# Patient Record
Sex: FEMALE | Race: WHITE | NOT HISPANIC OR LATINO | Employment: FULL TIME | URBAN - NONMETROPOLITAN AREA
[De-identification: names, ages, dates, MRNs, and addresses within clinical notes are randomized per-mention and may not be internally consistent; named-entity substitution may affect disease eponyms.]

---

## 2023-03-01 PROBLEM — M79.671 PAIN OF RIGHT HEEL: Status: ACTIVE | Noted: 2023-03-01

## 2023-03-01 PROBLEM — B96.89 BACTERIAL VAGINOSIS: Status: ACTIVE | Noted: 2023-03-01

## 2023-03-01 PROBLEM — F17.200 SMOKER: Status: ACTIVE | Noted: 2023-03-01

## 2023-03-01 PROBLEM — H92.09 OTALGIA: Status: ACTIVE | Noted: 2023-03-01

## 2023-03-01 PROBLEM — N39.0 URINARY TRACT INFECTION: Status: ACTIVE | Noted: 2023-03-01

## 2023-03-01 PROBLEM — R68.84 JAW PAIN: Status: ACTIVE | Noted: 2023-03-01

## 2023-03-01 PROBLEM — N93.9 ABNORMAL UTERINE BLEEDING (AUB): Status: ACTIVE | Noted: 2023-03-01

## 2023-03-01 PROBLEM — U07.1 COVID-19: Status: ACTIVE | Noted: 2023-03-01

## 2023-03-01 PROBLEM — E78.00 HYPERCHOLESTEROLEMIA: Status: ACTIVE | Noted: 2023-03-01

## 2023-03-01 PROBLEM — B97.7 HPV IN FEMALE: Status: ACTIVE | Noted: 2023-03-01

## 2023-03-01 PROBLEM — R42 DIZZINESS: Status: ACTIVE | Noted: 2023-03-01

## 2023-03-01 PROBLEM — N94.6 DYSMENORRHEA: Status: ACTIVE | Noted: 2023-03-01

## 2023-03-01 PROBLEM — R07.9 CHEST PAIN: Status: ACTIVE | Noted: 2023-03-01

## 2023-03-01 PROBLEM — N76.0 BACTERIAL VAGINOSIS: Status: ACTIVE | Noted: 2023-03-01

## 2023-03-01 PROBLEM — I10 BENIGN ESSENTIAL HYPERTENSION: Status: ACTIVE | Noted: 2023-03-01

## 2023-03-01 PROBLEM — Z98.84 BARIATRIC SURGERY STATUS: Status: ACTIVE | Noted: 2023-03-01

## 2023-03-01 PROBLEM — E55.9 VITAMIN D DEFICIENCY: Status: ACTIVE | Noted: 2023-03-01

## 2023-03-01 PROBLEM — R29.818 SUSPECTED SLEEP APNEA: Status: ACTIVE | Noted: 2023-03-01

## 2023-03-01 PROBLEM — R87.622 LGSIL PAP SMEAR OF VAGINA: Status: ACTIVE | Noted: 2023-03-01

## 2023-03-01 PROBLEM — G47.30 SLEEP APNEA: Status: ACTIVE | Noted: 2023-03-01

## 2023-03-01 PROBLEM — N89.8 VAGINAL DISCHARGE: Status: ACTIVE | Noted: 2023-03-01

## 2023-03-01 PROBLEM — E66.01 MORBID OBESITY DUE TO EXCESS CALORIES (MULTI): Status: ACTIVE | Noted: 2023-03-01

## 2023-03-01 PROBLEM — Z04.9 CONDITION NOT FOUND: Status: ACTIVE | Noted: 2023-03-01

## 2023-03-01 RX ORDER — LOSARTAN POTASSIUM 25 MG/1
25 TABLET ORAL DAILY
COMMUNITY
End: 2023-09-08

## 2023-03-01 RX ORDER — VARENICLINE TARTRATE 0.5 (11)-1
KIT ORAL
COMMUNITY
Start: 2022-10-20 | End: 2023-10-04 | Stop reason: ALTCHOICE

## 2023-03-01 RX ORDER — ATORVASTATIN CALCIUM 20 MG/1
20 TABLET, FILM COATED ORAL DAILY
COMMUNITY
End: 2023-05-16

## 2023-03-01 RX ORDER — AMLODIPINE BESYLATE 10 MG/1
1 TABLET ORAL DAILY
COMMUNITY
Start: 2021-04-13 | End: 2023-06-14

## 2023-03-08 ENCOUNTER — APPOINTMENT (OUTPATIENT)
Dept: PRIMARY CARE | Facility: CLINIC | Age: 33
End: 2023-03-08
Payer: COMMERCIAL

## 2023-06-14 DIAGNOSIS — E11.9 TYPE 2 DIABETES MELLITUS WITHOUT COMPLICATION, WITHOUT LONG-TERM CURRENT USE OF INSULIN (MULTI): ICD-10-CM

## 2023-06-14 DIAGNOSIS — I10 ESSENTIAL (PRIMARY) HYPERTENSION: ICD-10-CM

## 2023-06-14 RX ORDER — EMPAGLIFLOZIN 25 MG/1
TABLET, FILM COATED ORAL
Qty: 90 TABLET | Refills: 0 | Status: SHIPPED | OUTPATIENT
Start: 2023-06-14 | End: 2023-10-04 | Stop reason: ALTCHOICE

## 2023-06-14 RX ORDER — AMLODIPINE BESYLATE 10 MG/1
TABLET ORAL
Qty: 90 TABLET | Refills: 0 | Status: SHIPPED | OUTPATIENT
Start: 2023-06-14 | End: 2023-10-24 | Stop reason: SDUPTHER

## 2023-06-16 ENCOUNTER — TELEPHONE (OUTPATIENT)
Dept: PRIMARY CARE | Facility: CLINIC | Age: 33
End: 2023-06-16
Payer: COMMERCIAL

## 2023-06-16 DIAGNOSIS — N39.0 URINARY TRACT INFECTION ASSOCIATED WITH CATHETERIZATION OF URINARY TRACT, UNSPECIFIED INDWELLING URINARY CATHETER TYPE, INITIAL ENCOUNTER (CMS-HCC): ICD-10-CM

## 2023-06-16 DIAGNOSIS — T83.511A URINARY TRACT INFECTION ASSOCIATED WITH CATHETERIZATION OF URINARY TRACT, UNSPECIFIED INDWELLING URINARY CATHETER TYPE, INITIAL ENCOUNTER (CMS-HCC): ICD-10-CM

## 2023-06-16 RX ORDER — NITROFURANTOIN 25; 75 MG/1; MG/1
100 CAPSULE ORAL 2 TIMES DAILY
Qty: 20 CAPSULE | Refills: 0 | Status: SHIPPED | OUTPATIENT
Start: 2023-06-16 | End: 2023-06-26

## 2023-06-16 NOTE — TELEPHONE ENCOUNTER
Pt called stated she did a home urine test +uti   Having urinary symptoms  Discussed with Dr. Maxwell magallanes

## 2023-07-04 DIAGNOSIS — F17.200 SMOKER: ICD-10-CM

## 2023-07-05 RX ORDER — VARENICLINE TARTRATE 1 MG/1
TABLET, FILM COATED ORAL
Qty: 56 TABLET | Refills: 1 | Status: SHIPPED | OUTPATIENT
Start: 2023-07-05 | End: 2023-07-20

## 2023-07-19 DIAGNOSIS — F17.200 SMOKER: ICD-10-CM

## 2023-07-20 RX ORDER — VARENICLINE TARTRATE 1 MG/1
TABLET, FILM COATED ORAL
Qty: 168 TABLET | Refills: 1 | Status: SHIPPED | OUTPATIENT
Start: 2023-07-20 | End: 2023-10-04 | Stop reason: ALTCHOICE

## 2023-10-04 ENCOUNTER — TELEMEDICINE (OUTPATIENT)
Dept: PRIMARY CARE | Facility: CLINIC | Age: 33
End: 2023-10-04
Payer: COMMERCIAL

## 2023-10-04 DIAGNOSIS — F32.A DEPRESSION, UNSPECIFIED DEPRESSION TYPE: ICD-10-CM

## 2023-10-04 PROCEDURE — 99213 OFFICE O/P EST LOW 20 MIN: CPT | Performed by: INTERNAL MEDICINE

## 2023-10-04 RX ORDER — CITALOPRAM 10 MG/1
10 TABLET ORAL DAILY
Qty: 30 TABLET | Refills: 1 | Status: SHIPPED | OUTPATIENT
Start: 2023-10-04 | End: 2023-10-25 | Stop reason: DRUGHIGH

## 2023-10-04 ASSESSMENT — PATIENT HEALTH QUESTIONNAIRE - PHQ9
SUM OF ALL RESPONSES TO PHQ QUESTIONS 1-9: 13
1. LITTLE INTEREST OR PLEASURE IN DOING THINGS: NEARLY EVERY DAY
10. IF YOU CHECKED OFF ANY PROBLEMS, HOW DIFFICULT HAVE THESE PROBLEMS MADE IT FOR YOU TO DO YOUR WORK, TAKE CARE OF THINGS AT HOME, OR GET ALONG WITH OTHER PEOPLE: SOMEWHAT DIFFICULT
4. FEELING TIRED OR HAVING LITTLE ENERGY: NEARLY EVERY DAY
5. POOR APPETITE OR OVEREATING: SEVERAL DAYS
2. FEELING DOWN, DEPRESSED OR HOPELESS: SEVERAL DAYS
9. THOUGHTS THAT YOU WOULD BE BETTER OFF DEAD, OR OF HURTING YOURSELF: NOT AT ALL
SUM OF ALL RESPONSES TO PHQ9 QUESTIONS 1 AND 2: 4
7. TROUBLE CONCENTRATING ON THINGS, SUCH AS READING THE NEWSPAPER OR WATCHING TELEVISION: NOT AT ALL
6. FEELING BAD ABOUT YOURSELF - OR THAT YOU ARE A FAILURE OR HAVE LET YOURSELF OR YOUR FAMILY DOWN: NOT AT ALL
8. MOVING OR SPEAKING SO SLOWLY THAT OTHER PEOPLE COULD HAVE NOTICED. OR THE OPPOSITE, BEING SO FIGETY OR RESTLESS THAT YOU HAVE BEEN MOVING AROUND A LOT MORE THAN USUAL: MORE THAN HALF THE DAYS
3. TROUBLE FALLING OR STAYING ASLEEP OR SLEEPING TOO MUCH: NEARLY EVERY DAY

## 2023-10-04 NOTE — PROGRESS NOTES
Patient ID: She states that she has been feeling down, not wanting to get out of bed, no motivation. She states that she was placed on medication in the past, which was effective, and then took herself off of it. She states she has been sleeping too much. She denies any SI, HI.     HPI Lala Smart is a 33 y.o. female with PMH remarkable for HTN, Type 2 DM  who presents to the office today for Depression.    Acute Issue: Depression  LOV: 2/14/23    REVIEW OF SYSTEMS:  Review of Systems  12 point review of systems negative unless stated above in HPI    VITAL SIGNS:  There were no vitals filed for this visit.    PHYSICAL EXAM:  CONSTITUTIONAL: Well developed, alert and oriented x3, no distress, alert and cooperative  EYES: EOMI intact  ENMT: Mucous membranes appear moist  HEART+LUNGS, ABD: unable to obtain d/t virtual visit today.   NEUROLOGIC: Alert & Oriented x3, able to follow simple commands    ALLERGIES:  Allergies   Allergen Reactions    Lisinopril Cough      MEDICATIONS:  Current Outpatient Medications on File Prior to Visit   Medication Sig Dispense Refill    amLODIPine (Norvasc) 10 mg tablet TAKE 1 TABLET BY MOUTH EVERY DAY 90 tablet 0    losartan (Cozaar) 25 mg tablet TAKE 1 TABLET BY MOUTH EVERY DAY 90 tablet 1    [DISCONTINUED] atorvastatin (Lipitor) 20 mg tablet TAKE 1 TABLET BY MOUTH EVERY DAY 90 tablet 0    [DISCONTINUED] Jardiance 25 mg TAKE 1 TABLET BY MOUTH EVERY DAY 90 tablet 0    [DISCONTINUED] varenicline (Chantix KIMBERLY) 0.5 mg (11)- 1 mg (42) tablet Take as directed per package      [DISCONTINUED] varenicline (Chantix) 1 mg tablet TAKE AS DIRECTED PER PACKAGE INSTRUCTIONS. 168 tablet 1     No current facility-administered medications on file prior to visit.        Your medication list            Accurate as of October 4, 2023  3:06 PM. If you have any questions, ask your nurse or doctor.                CONTINUE taking these medications        Instructions Last Dose Given Next Dose Due    amLODIPine 10 mg tablet  Commonly known as: Norvasc      TAKE 1 TABLET BY MOUTH EVERY DAY       losartan 25 mg tablet  Commonly known as: Cozaar      TAKE 1 TABLET BY MOUTH EVERY DAY              STOP taking these medications      atorvastatin 20 mg tablet  Commonly known as: Lipitor  Stopped by: Shalonda York MD        Jardiance 25 mg  Generic drug: empagliflozin  Stopped by: Shalonda York MD        varenicline 0.5 mg (11)- 1 mg (42) tablet  Commonly known as: Chantix KIMBERLY  Stopped by: Shalonda York MD        varenicline 1 mg tablet  Commonly known as: Chantix  Stopped by: Shalonda York MD                 RECENT LABS:  Lab Results   Component Value Date    WBC 9.1 02/14/2023    HGB 15.1 02/14/2023    HCT 46.2 (H) 02/14/2023     02/14/2023    CHOL 270 (H) 02/14/2023    TRIG 129 02/14/2023    HDL 72.0 02/14/2023    ALT 30 02/14/2023    AST 23 02/14/2023     (L) 02/14/2023    K 3.9 02/14/2023     02/14/2023    CREATININE 0.76 02/14/2023    BUN 13 02/14/2023    CO2 26 02/14/2023    TSH 1.08 05/26/2022    INR 1.0 04/15/2021    HGBA1C 5.0 04/15/2021       ASSESSMENT AND PLAN:  Assessment/Plan   An interactive audio and video telecommunication system which permits real time communications between the patient (at the originating site) and provider (at the distant site) was utilized to provide this telehealth service. Virtual visit is being performed for nursing home patient during the COVID-19 crisis due to restricted access, to reduce close contact with high-risk patients, and/or in an effort to preserve PPE. Verbal consent was obtained from patient and/or POA/guardian.    Depression  - she states she has been lacking motivation, has not wanted to get out of bed in mornings  - denies SI, HI  - she states she has felt this way in the past, was on Celexa and it was effective  - she is aware of side effects of medication, states she tolerated it well in the past  - will start on Celexa 10mg  daily, prescription sent in  - advised to follow up in one month    ------  Written by Anna Marie Cutler LPN, acting as a scribe for Dr. Arreola. This note accurately reflects the work and decisions made by Dr. Arreola.     I, Dr. Arreola, attest all medical record entries made by the scribe were under my direction and were personally dictated by me. I have reviewed the chart and agree that the record accurately reflects my performance of the history, physical exam, and assessment and plan.

## 2023-10-06 NOTE — PATIENT INSTRUCTIONS
It was great to see you in the office today! Here is what we discussed at your visit today:  WE have sent in prescription for Celexa as discussed  Follow up in one month

## 2023-10-24 DIAGNOSIS — F32.A DEPRESSION, UNSPECIFIED DEPRESSION TYPE: ICD-10-CM

## 2023-10-24 DIAGNOSIS — I10 ESSENTIAL (PRIMARY) HYPERTENSION: ICD-10-CM

## 2023-10-24 RX ORDER — AMLODIPINE BESYLATE 10 MG/1
10 TABLET ORAL DAILY
Qty: 90 TABLET | Refills: 0 | Status: SHIPPED | OUTPATIENT
Start: 2023-10-24 | End: 2023-12-21 | Stop reason: SDUPTHER

## 2023-10-25 RX ORDER — CITALOPRAM 20 MG/1
20 TABLET, FILM COATED ORAL DAILY
Qty: 90 TABLET | Refills: 0 | Status: SHIPPED | OUTPATIENT
Start: 2023-10-25 | End: 2024-01-18 | Stop reason: ALTCHOICE

## 2023-12-04 ENCOUNTER — APPOINTMENT (OUTPATIENT)
Dept: RADIOLOGY | Facility: HOSPITAL | Age: 33
End: 2023-12-04
Payer: COMMERCIAL

## 2023-12-04 ENCOUNTER — HOSPITAL ENCOUNTER (EMERGENCY)
Facility: HOSPITAL | Age: 33
Discharge: HOME | End: 2023-12-04
Attending: STUDENT IN AN ORGANIZED HEALTH CARE EDUCATION/TRAINING PROGRAM
Payer: COMMERCIAL

## 2023-12-04 VITALS
WEIGHT: 265 LBS | HEIGHT: 66 IN | HEART RATE: 89 BPM | TEMPERATURE: 97.9 F | BODY MASS INDEX: 42.59 KG/M2 | SYSTOLIC BLOOD PRESSURE: 150 MMHG | RESPIRATION RATE: 16 BRPM | DIASTOLIC BLOOD PRESSURE: 99 MMHG | OXYGEN SATURATION: 100 %

## 2023-12-04 DIAGNOSIS — M25.531 ACUTE PAIN OF RIGHT WRIST: Primary | ICD-10-CM

## 2023-12-04 PROCEDURE — 2500000001 HC RX 250 WO HCPCS SELF ADMINISTERED DRUGS (ALT 637 FOR MEDICARE OP): Performed by: STUDENT IN AN ORGANIZED HEALTH CARE EDUCATION/TRAINING PROGRAM

## 2023-12-04 PROCEDURE — 73110 X-RAY EXAM OF WRIST: CPT | Mod: RT,FY

## 2023-12-04 PROCEDURE — 99283 EMERGENCY DEPT VISIT LOW MDM: CPT | Performed by: STUDENT IN AN ORGANIZED HEALTH CARE EDUCATION/TRAINING PROGRAM

## 2023-12-04 RX ORDER — HYDROCODONE BITARTRATE AND ACETAMINOPHEN 5; 325 MG/1; MG/1
1 TABLET ORAL EVERY 6 HOURS PRN
Qty: 10 TABLET | Refills: 0 | Status: SHIPPED | OUTPATIENT
Start: 2023-12-04 | End: 2023-12-07

## 2023-12-04 RX ORDER — NAPROXEN 500 MG/1
500 TABLET ORAL
Qty: 30 TABLET | Refills: 0 | Status: SHIPPED | OUTPATIENT
Start: 2023-12-04 | End: 2023-12-19

## 2023-12-04 RX ORDER — HYDROCODONE BITARTRATE AND ACETAMINOPHEN 5; 325 MG/1; MG/1
1 TABLET ORAL ONCE
Status: COMPLETED | OUTPATIENT
Start: 2023-12-04 | End: 2023-12-04

## 2023-12-04 RX ADMIN — HYDROCODONE BITARTRATE AND ACETAMINOPHEN 1 TABLET: 5; 325 TABLET ORAL at 23:10

## 2023-12-04 ASSESSMENT — PAIN - FUNCTIONAL ASSESSMENT: PAIN_FUNCTIONAL_ASSESSMENT: 0-10

## 2023-12-04 ASSESSMENT — PAIN DESCRIPTION - ORIENTATION: ORIENTATION: RIGHT

## 2023-12-04 ASSESSMENT — PAIN DESCRIPTION - FREQUENCY: FREQUENCY: CONSTANT/CONTINUOUS

## 2023-12-04 ASSESSMENT — PAIN DESCRIPTION - PAIN TYPE: TYPE: ACUTE PAIN

## 2023-12-04 ASSESSMENT — PAIN DESCRIPTION - LOCATION: LOCATION: WRIST

## 2023-12-04 ASSESSMENT — PAIN SCALES - GENERAL: PAINLEVEL_OUTOF10: 10 - WORST POSSIBLE PAIN

## 2023-12-04 ASSESSMENT — COLUMBIA-SUICIDE SEVERITY RATING SCALE - C-SSRS
2. HAVE YOU ACTUALLY HAD ANY THOUGHTS OF KILLING YOURSELF?: NO
1. IN THE PAST MONTH, HAVE YOU WISHED YOU WERE DEAD OR WISHED YOU COULD GO TO SLEEP AND NOT WAKE UP?: NO
6. HAVE YOU EVER DONE ANYTHING, STARTED TO DO ANYTHING, OR PREPARED TO DO ANYTHING TO END YOUR LIFE?: NO

## 2023-12-04 ASSESSMENT — PAIN DESCRIPTION - PROGRESSION: CLINICAL_PROGRESSION: GRADUALLY WORSENING

## 2023-12-04 ASSESSMENT — PAIN DESCRIPTION - ONSET: ONSET: SUDDEN

## 2023-12-04 ASSESSMENT — PAIN DESCRIPTION - DESCRIPTORS: DESCRIPTORS: SHARP;ACHING;BURNING

## 2023-12-04 NOTE — Clinical Note
Lala Smart was seen and treated in our emergency department on 12/4/2023.  She may return to work on 12/06/2023.       If you have any questions or concerns, please don't hesitate to call.      Anna Marie Rodriguez MD
Lala Smart was seen and treated in our emergency department on 12/4/2023.  She may return to work on 12/06/2023.       If you have any questions or concerns, please don't hesitate to call.      Anna Marie Rodriguez MD
dentures/eyeglasses

## 2023-12-05 NOTE — DISCHARGE INSTRUCTIONS
You were seen in the emergency department today for wrist pain after a fall.  At this time we do not see a fracture.  However given your significant amount of pain, I have placed you in a thumb spica splint and recommend that you follow-up with a hand surgeon in the next 2 weeks for repeat x-rays.  I recommend taking anti-inflammatories as needed for pain and I have also prescribed you stronger pain medication as well.

## 2023-12-07 ENCOUNTER — APPOINTMENT (OUTPATIENT)
Dept: ORTHOPEDIC SURGERY | Facility: HOSPITAL | Age: 33
End: 2023-12-07
Payer: COMMERCIAL

## 2023-12-16 NOTE — ED PROVIDER NOTES
HPI   Chief Complaint   Patient presents with    Wrist Injury     Pt has right wrist pain from falling outside and catching herself.       33-year-old female presents for right wrist pain.  Patient states that she was ambulating when she slipped and fell, falling onto an outstretched hand.  She notes increased pain to her right wrist and thumb, worsens with movement.  Notes increased swelling.  No numbness or tingling.  No other injuries.  Did not hit her head, no loss consciousness.                          No data recorded                Patient History   Past Medical History:   Diagnosis Date    Acute bronchitis due to other specified organisms 03/30/2020    Acute viral bronchitis    Candidiasis, unspecified 06/13/2016    Yeast infection    Encounter for pregnancy test, result negative     Pregnancy examination or test, negative result    Encounter for pregnancy test, result unknown     Pregnancy examination or test, pregnancy unconfirmed    Essential (primary) hypertension 11/27/2018    White coat syndrome with hypertension    Hirsutism 06/07/2016    Hirsutism    Low grade squamous intraepithelial lesion on cytologic smear of vagina (LGSIL) 06/02/2016    LGSIL Pap smear of vagina    Papillomavirus as the cause of diseases classified elsewhere 06/02/2016    HPV in female    Personal history of other diseases of the female genital tract 05/02/2016    History of ovarian cyst    Personal history of other endocrine, nutritional and metabolic disease     History of hypercholesterolemia    Personal history of other endocrine, nutritional and metabolic disease 05/06/2015    History of obesity    Personal history of other infectious and parasitic diseases     History of chicken pox    Personal history of other specified conditions 02/25/2020    History of fatigue    Personal history of other specified conditions     History of abnormal Pap smear    Unspecified tubal pregnancy without intrauterine pregnancy     Ectopic  pregnancy, tubal     Past Surgical History:   Procedure Laterality Date     SECTION, CLASSIC  2016     Section    COLONOSCOPY  2016    Colonoscopy (Fiberoptic)    DILATION AND CURETTAGE OF UTERUS  2016    Dilation And Curettage    LAPAROSCOPY DIAGNOSTIC / BIOPSY / ASPIRATION / LYSIS  2016    Exploratory Laparoscopy    OTHER SURGICAL HISTORY  2016    Colposcopy Cervix With Biopsy(S)    OTHER SURGICAL HISTORY  2016    Laparoscopy With Excision Of Ectopic Pregnancy    TUBAL LIGATION  2016    Tubal Ligation     Family History   Problem Relation Name Age of Onset    Depression Mother          with anxiety    Diabetes type II Father       Social History     Tobacco Use    Smoking status: Not on file    Smokeless tobacco: Not on file   Substance Use Topics    Alcohol use: Not on file    Drug use: Not on file       Physical Exam   ED Triage Vitals [23 2142]   Temp Heart Rate Resp BP   36.5 °C (97.7 °F) (!) 111 20 (!) 151/101      SpO2 Temp Source Heart Rate Source Patient Position   100 % Oral Monitor Sitting      BP Location FiO2 (%)     Left arm --       Physical Exam  Vitals and nursing note reviewed.   Constitutional:       General: She is not in acute distress.     Appearance: She is not ill-appearing.   HENT:      Head: Normocephalic and atraumatic.      Mouth/Throat:      Mouth: Mucous membranes are moist.      Pharynx: Oropharynx is clear.   Eyes:      Extraocular Movements: Extraocular movements intact.      Conjunctiva/sclera: Conjunctivae normal.      Pupils: Pupils are equal, round, and reactive to light.   Cardiovascular:      Rate and Rhythm: Normal rate and regular rhythm.   Pulmonary:      Effort: Pulmonary effort is normal. No respiratory distress.      Breath sounds: Normal breath sounds.   Abdominal:      General: There is no distension.      Palpations: Abdomen is soft.      Tenderness: There is no abdominal tenderness.   Musculoskeletal:       Cervical back: Normal range of motion and neck supple.      Comments: Right hypothenar eminence with ecchymosis and swelling, Stafac tenderness, tenderness to palpation over the radial edge of wrist.  Minimal pain with supination pronation.  Sensation intact light touch.  Palpable radial and ulnar pulses.  Flexion extension intact to thumb.   Skin:     General: Skin is warm and dry.      Capillary Refill: Capillary refill takes less than 2 seconds.   Neurological:      General: No focal deficit present.      Mental Status: She is alert and oriented to person, place, and time. Mental status is at baseline.   Psychiatric:         Mood and Affect: Mood normal.         Behavior: Behavior normal.         ED Course & MDM   Diagnoses as of 12/16/23 2720   Acute pain of right wrist       Medical Decision Making  33 y.o. female presents to the ED with wrist pain and swelling after fall. No deformity, no open wounds, or neurovascular compromise on exam.  In the evaluation of this patient's arm pain I considered the following diagnoses: fracture, muscle strain, contusion, sprain.  X-ray negative for fracture.  Given patient's increased tenderness, place patient in thumb spica.  Patient given instructions for outpatient hand surgery evaluation with repeat x-rays.  Patient given oral pain medication.  Patient discharged home with outpatient follow-up recommended.        Procedure  Procedures     Anna Marie Rodriguez MD  12/16/23 1268

## 2023-12-21 DIAGNOSIS — I10 ESSENTIAL (PRIMARY) HYPERTENSION: ICD-10-CM

## 2023-12-22 RX ORDER — AMLODIPINE BESYLATE 10 MG/1
10 TABLET ORAL DAILY
Qty: 90 TABLET | Refills: 0 | Status: SHIPPED | OUTPATIENT
Start: 2023-12-22 | End: 2024-01-18 | Stop reason: SDUPTHER

## 2023-12-26 DIAGNOSIS — I10 BENIGN ESSENTIAL HYPERTENSION: ICD-10-CM

## 2023-12-26 RX ORDER — LOSARTAN POTASSIUM 25 MG/1
25 TABLET ORAL DAILY
Qty: 90 TABLET | Refills: 0 | Status: SHIPPED | OUTPATIENT
Start: 2023-12-26 | End: 2024-03-26

## 2024-01-18 ENCOUNTER — LAB (OUTPATIENT)
Dept: LAB | Facility: LAB | Age: 34
End: 2024-01-18
Payer: COMMERCIAL

## 2024-01-18 ENCOUNTER — OFFICE VISIT (OUTPATIENT)
Dept: PRIMARY CARE | Facility: CLINIC | Age: 34
End: 2024-01-18
Payer: COMMERCIAL

## 2024-01-18 VITALS
RESPIRATION RATE: 18 BRPM | SYSTOLIC BLOOD PRESSURE: 138 MMHG | OXYGEN SATURATION: 98 % | HEIGHT: 66 IN | HEART RATE: 95 BPM | DIASTOLIC BLOOD PRESSURE: 101 MMHG | BODY MASS INDEX: 42.77 KG/M2

## 2024-01-18 DIAGNOSIS — Z00.00 ANNUAL PHYSICAL EXAM: ICD-10-CM

## 2024-01-18 DIAGNOSIS — I10 ESSENTIAL (PRIMARY) HYPERTENSION: ICD-10-CM

## 2024-01-18 DIAGNOSIS — Z71.6 ENCOUNTER FOR SMOKING CESSATION COUNSELING: ICD-10-CM

## 2024-01-18 DIAGNOSIS — Z00.00 ANNUAL PHYSICAL EXAM: Primary | ICD-10-CM

## 2024-01-18 DIAGNOSIS — G47.00 INSOMNIA, UNSPECIFIED TYPE: ICD-10-CM

## 2024-01-18 DIAGNOSIS — F17.200 SMOKER: ICD-10-CM

## 2024-01-18 PROBLEM — J06.9 ACUTE URI: Status: RESOLVED | Noted: 2024-01-18 | Resolved: 2024-01-18

## 2024-01-18 PROBLEM — R55 NEAR SYNCOPE: Status: RESOLVED | Noted: 2024-01-18 | Resolved: 2024-01-18

## 2024-01-18 PROBLEM — R09.81 CONGESTION OF NASAL SINUS: Status: RESOLVED | Noted: 2024-01-18 | Resolved: 2024-01-18

## 2024-01-18 PROBLEM — J01.90 ACUTE SINUSITIS: Status: RESOLVED | Noted: 2024-01-18 | Resolved: 2024-01-18

## 2024-01-18 PROBLEM — R00.2 PALPITATIONS: Status: RESOLVED | Noted: 2024-01-18 | Resolved: 2024-01-18

## 2024-01-18 LAB
25(OH)D3 SERPL-MCNC: 10 NG/ML (ref 30–100)
ALBUMIN SERPL BCP-MCNC: 4.3 G/DL (ref 3.4–5)
ALP SERPL-CCNC: 74 U/L (ref 33–110)
ALT SERPL W P-5'-P-CCNC: 55 U/L (ref 7–45)
ANION GAP SERPL CALC-SCNC: 14 MMOL/L (ref 10–20)
AST SERPL W P-5'-P-CCNC: 35 U/L (ref 9–39)
BASOPHILS # BLD AUTO: 0.07 X10*3/UL (ref 0–0.1)
BASOPHILS NFR BLD AUTO: 0.8 %
BILIRUB SERPL-MCNC: 0.6 MG/DL (ref 0–1.2)
BUN SERPL-MCNC: 11 MG/DL (ref 6–23)
CALCIUM SERPL-MCNC: 9.4 MG/DL (ref 8.6–10.3)
CHLORIDE SERPL-SCNC: 103 MMOL/L (ref 98–107)
CHOLEST SERPL-MCNC: 251 MG/DL (ref 0–199)
CHOLESTEROL/HDL RATIO: 4.5
CO2 SERPL-SCNC: 26 MMOL/L (ref 21–32)
CREAT SERPL-MCNC: 0.75 MG/DL (ref 0.5–1.05)
EGFRCR SERPLBLD CKD-EPI 2021: >90 ML/MIN/1.73M*2
EOSINOPHIL # BLD AUTO: 0.16 X10*3/UL (ref 0–0.7)
EOSINOPHIL NFR BLD AUTO: 1.7 %
ERYTHROCYTE [DISTWIDTH] IN BLOOD BY AUTOMATED COUNT: 12.9 % (ref 11.5–14.5)
EST. AVERAGE GLUCOSE BLD GHB EST-MCNC: 91 MG/DL
GLUCOSE SERPL-MCNC: 88 MG/DL (ref 74–99)
HBA1C MFR BLD: 4.8 %
HCT VFR BLD AUTO: 45.7 % (ref 36–46)
HDLC SERPL-MCNC: 55.9 MG/DL
HGB BLD-MCNC: 14.8 G/DL (ref 12–16)
IMM GRANULOCYTES # BLD AUTO: 0.04 X10*3/UL (ref 0–0.7)
IMM GRANULOCYTES NFR BLD AUTO: 0.4 % (ref 0–0.9)
LDLC SERPL CALC-MCNC: 175 MG/DL
LYMPHOCYTES # BLD AUTO: 2.18 X10*3/UL (ref 1.2–4.8)
LYMPHOCYTES NFR BLD AUTO: 23.6 %
MCH RBC QN AUTO: 32.1 PG (ref 26–34)
MCHC RBC AUTO-ENTMCNC: 32.4 G/DL (ref 32–36)
MCV RBC AUTO: 99 FL (ref 80–100)
MONOCYTES # BLD AUTO: 0.7 X10*3/UL (ref 0.1–1)
MONOCYTES NFR BLD AUTO: 7.6 %
NEUTROPHILS # BLD AUTO: 6.09 X10*3/UL (ref 1.2–7.7)
NEUTROPHILS NFR BLD AUTO: 65.9 %
NON HDL CHOLESTEROL: 195 MG/DL (ref 0–149)
NRBC BLD-RTO: 0 /100 WBCS (ref 0–0)
PLATELET # BLD AUTO: 287 X10*3/UL (ref 150–450)
POTASSIUM SERPL-SCNC: 4 MMOL/L (ref 3.5–5.3)
PROT SERPL-MCNC: 7.2 G/DL (ref 6.4–8.2)
RBC # BLD AUTO: 4.61 X10*6/UL (ref 4–5.2)
SODIUM SERPL-SCNC: 139 MMOL/L (ref 136–145)
TRIGL SERPL-MCNC: 99 MG/DL (ref 0–149)
TSH SERPL-ACNC: 1.4 MIU/L (ref 0.44–3.98)
VIT B12 SERPL-MCNC: 346 PG/ML (ref 211–911)
VLDL: 20 MG/DL (ref 0–40)
WBC # BLD AUTO: 9.2 X10*3/UL (ref 4.4–11.3)

## 2024-01-18 PROCEDURE — 83036 HEMOGLOBIN GLYCOSYLATED A1C: CPT

## 2024-01-18 PROCEDURE — 99395 PREV VISIT EST AGE 18-39: CPT

## 2024-01-18 PROCEDURE — 3080F DIAST BP >= 90 MM HG: CPT

## 2024-01-18 PROCEDURE — 82607 VITAMIN B-12: CPT

## 2024-01-18 PROCEDURE — 4004F PT TOBACCO SCREEN RCVD TLK: CPT

## 2024-01-18 PROCEDURE — 36415 COLL VENOUS BLD VENIPUNCTURE: CPT

## 2024-01-18 PROCEDURE — 3075F SYST BP GE 130 - 139MM HG: CPT

## 2024-01-18 PROCEDURE — 82306 VITAMIN D 25 HYDROXY: CPT

## 2024-01-18 PROCEDURE — 99214 OFFICE O/P EST MOD 30 MIN: CPT

## 2024-01-18 RX ORDER — OMEPRAZOLE 20 MG/1
20 CAPSULE, DELAYED RELEASE ORAL
COMMUNITY

## 2024-01-18 RX ORDER — IBUPROFEN 200 MG
1 TABLET ORAL EVERY 24 HOURS
Qty: 45 PATCH | Refills: 0 | Status: SHIPPED | OUTPATIENT
Start: 2024-01-18 | End: 2024-03-03

## 2024-01-18 RX ORDER — VARENICLINE TARTRATE 1 MG/1
1 TABLET, FILM COATED ORAL 2 TIMES DAILY
Qty: 60 TABLET | Refills: 2 | Status: SHIPPED | OUTPATIENT
Start: 2024-01-18 | End: 2024-05-01

## 2024-01-18 RX ORDER — AMLODIPINE BESYLATE 10 MG/1
10 TABLET ORAL DAILY
Qty: 90 TABLET | Refills: 0 | Status: SHIPPED | OUTPATIENT
Start: 2024-01-18

## 2024-01-18 RX ORDER — TRAZODONE HYDROCHLORIDE 50 MG/1
50 TABLET ORAL NIGHTLY PRN
Qty: 30 TABLET | Refills: 0 | Status: SHIPPED | OUTPATIENT
Start: 2024-01-18 | End: 2024-02-22

## 2024-01-18 ASSESSMENT — PATIENT HEALTH QUESTIONNAIRE - PHQ9
1. LITTLE INTEREST OR PLEASURE IN DOING THINGS: NOT AT ALL
2. FEELING DOWN, DEPRESSED OR HOPELESS: NOT AT ALL
SUM OF ALL RESPONSES TO PHQ9 QUESTIONS 1 AND 2: 0

## 2024-01-18 ASSESSMENT — ENCOUNTER SYMPTOMS
CHILLS: 0
ACTIVITY CHANGE: 1
NAUSEA: 0
FATIGUE: 0
SHORTNESS OF BREATH: 0
VOMITING: 0
FEVER: 0
DIARRHEA: 0
APPETITE CHANGE: 0

## 2024-01-18 ASSESSMENT — PAIN SCALES - GENERAL: PAINLEVEL: 0-NO PAIN

## 2024-01-18 NOTE — PROGRESS NOTES
"Subjective   Patient ID:   Lala Smart is a 33 y.o. female who presents for Annual Exam (Requesting annual labs), Insomnia, Weight Loss (Would like to discuss options), and Nicotine Dependence (Would like to discuss options  to quit ).    HPI  Patient is a 33 y.o. female with PMH of HTN, Hypercholesterolemia, obesity, self-reported ADHD, current tobacco smoker, and suspected ARSALAN is here today for annual exam. Patient reports experiencing insomnia, not being able to fall or stay asleep. Reports getting 4-6 hours of sleep per night. Mentions she has tried OTC sleep aids like doxylamine succinate and melatonin in the past with no relief. Has tried sound machines. Mentions both her and her  snore. Patient had a sleep study done in 04/2021 which recommended \"in-lab polysomnography if there remains high clinical suspicion of ARSALAN\" with a \"consideration for referral to sleep medicine.\" Patient also reports she is ready to quite smoking. Mentions she has tried Chantix in the past and was very close to quitting, \"but the medication didn't last long enough.\" Smokes 1 ppd for the last 20 years. Patient also reports weighing the most she has in her life due to trauma she experienced in the last year. Mentions she is not as active as she used to be, but does try to focus on eating the right things and calorie counting.       Pain scale: 0 (no pain)  Living will? No  POA? No  Are you currently or have you recently been threatened or abused? No  Do you feel unsafe going back to the place you are living? No  Reported health: Fair  Dental visits? Yes  Hearing problems? No  Vision problems? Yes - wears contacts  Healthy diet? Yes  Exercise? No exercise  Adequate fluid intake? Yes    Social History     Tobacco Use    Smoking status: Every Day     Packs/day: 1.00     Years: 20.00     Additional pack years: 0.00     Total pack years: 20.00     Types: Cigarettes    Smokeless tobacco: Never   Substance Use Topics    Alcohol " use: Yes         There is no immunization history on file for this patient.    Review of Systems   Constitutional:  Positive for activity change. Negative for appetite change, chills, fatigue and fever.   Respiratory:  Negative for shortness of breath.    Cardiovascular:  Negative for chest pain.   Gastrointestinal:  Negative for diarrhea, nausea and vomiting.   All other systems reviewed and are negative.    12 point review of systems negative unless stated above in HPI    Vitals:    24 1039   BP: (!) 138/101   Pulse: 95   Resp: 18   SpO2: 98%       Physical Exam  Vitals reviewed.   Constitutional:       Appearance: Normal appearance.   HENT:      Head: Normocephalic and atraumatic.      Right Ear: Tympanic membrane, ear canal and external ear normal.      Left Ear: Tympanic membrane, ear canal and external ear normal.   Eyes:      Extraocular Movements: Extraocular movements intact.      Conjunctiva/sclera: Conjunctivae normal.      Pupils: Pupils are equal, round, and reactive to light.   Cardiovascular:      Rate and Rhythm: Normal rate and regular rhythm.      Pulses: Normal pulses.      Heart sounds: Normal heart sounds.   Pulmonary:      Effort: Pulmonary effort is normal.      Breath sounds: Normal breath sounds.   Abdominal:      General: Bowel sounds are normal.   Musculoskeletal:         General: Normal range of motion.      Cervical back: Normal range of motion and neck supple.      Right lower le+ Edema present.      Left lower le+ Edema present.   Neurological:      General: No focal deficit present.      Mental Status: She is alert and oriented to person, place, and time.   Psychiatric:         Mood and Affect: Mood normal.         Behavior: Behavior normal.         Thought Content: Thought content normal.         Judgment: Judgment normal.         Assessment/Plan   Problem List Items Addressed This Visit             ICD-10-CM    Smoker F17.200     - Patient is ready to quit today.  "Options for quitting discussed.  - Patient has tried Chantix in the past with almost full success.  - Recommend Chantix and Nicotine transdermal patch.  - Patient also reports having tried Wellbutrin in the past which she did not like. Stated it made her feel and act crazy.  - Follow up in 1 month to discuss dose adjustment.          Relevant Medications    varenicline (Chantix) 1 mg tablet    nicotine (Nicoderm CQ) 21 mg/24 hr patch    Annual physical exam - Primary Z00.00     - Annual blood work ordered.  - Continue with current medications and doses.  - Recommend continue taking BP at home. Patient to seek care at ER with chest pain, SOB, or any other worsening symptoms.   - Will follow up with results.          Relevant Orders    TSH with reflex to Free T4 if abnormal    Hemoglobin A1C    CBC and Auto Differential    Lipid Panel    Comprehensive Metabolic Panel    Vitamin D 25-Hydroxy,Total (for eval of Vitamin D levels)    Vitamin B12    Insomnia G47.00     - Patient having difficulty falling and staying asleep. Has PMH of suspected ARSALAN from sleep study in 04/2021.   - Has tried OTC medications and melatonin with no relief.  - Discussed lifestyle changes and wind down routine for bed time.  - Patient would like to try prescription sleep aid. Will try trazadone. Follow up in 1 month.  - Recommend following up with Adult Sleep Medicine per sleep study results of \"suspected ARSALAN\"          Relevant Medications    traZODone (Desyrel) 50 mg tablet    Other Relevant Orders    Referral to Adult Sleep Medicine     Other Visit Diagnoses         Codes    Essential (primary) hypertension     I10    Relevant Medications    amLODIPine (Norvasc) 10 mg tablet    Encounter for smoking cessation counseling     Z71.6    Relevant Medications    varenicline (Chantix) 1 mg tablet    nicotine (Nicoderm CQ) 21 mg/24 hr patch          "

## 2024-01-18 NOTE — ASSESSMENT & PLAN NOTE
- Annual blood work ordered.  - Continue with current medications and doses.  - Recommend continue taking BP at home. Patient to seek care at ER with chest pain, SOB, or any other worsening symptoms.   - Will follow up with results.

## 2024-01-18 NOTE — ASSESSMENT & PLAN NOTE
- Patient is ready to quit today. Options for quitting discussed.  - Patient has tried Chantix in the past with almost full success.  - Recommend Chantix and Nicotine transdermal patch.  - Patient also reports having tried Wellbutrin in the past which she did not like. Stated it made her feel and act crazy.  - Follow up in 1 month to discuss dose adjustment.

## 2024-01-18 NOTE — ASSESSMENT & PLAN NOTE
"- Patient having difficulty falling and staying asleep. Has PMH of suspected ARSALAN from sleep study in 04/2021.   - Has tried OTC medications and melatonin with no relief.  - Discussed lifestyle changes and wind down routine for bed time.  - Patient would like to try prescription sleep aid. Will try trazadone. Follow up in 1 month.  - Recommend following up with Adult Sleep Medicine per sleep study results of \"suspected ARSALAN\"   "

## 2024-01-22 DIAGNOSIS — R79.89 LOW VITAMIN D LEVEL: Primary | ICD-10-CM

## 2024-01-22 RX ORDER — ERGOCALCIFEROL 1.25 MG/1
50000 CAPSULE ORAL
Qty: 12 CAPSULE | Refills: 0 | Status: SHIPPED | OUTPATIENT
Start: 2024-01-22 | End: 2024-05-15

## 2024-02-06 NOTE — PROGRESS NOTES
" Patient: Lala Smart    [id number]  : 1990 -- AGE 33 y.o.    Provider: Aryan Arellano MD     [facility]   Service Date: 2024              [facility]  New Visit Note      The patient's referring provider is: Lata Cuenca PA-C    HPI:  Lala Smart is a 33 y.o. female with PMH notable for HTN, HLD, morbid obesity, vitamin D deficiency, COVID-19, insomnia, and nicotine dependence, who presents today for suspected sleep apnea and insomnia.      NIGHTTIME SYMPTOMS:   Snoring: {Blank single:::\"Yes\",\"No\"}  Witnessed apnea: {Blank single:::\"Yes\",\"No\"}  Nocturnal gasping: {Blank single:::\"Yes\",\"No\"}  Nocturnal choking: {Blank single:::\"Yes\",\"No\"}  Sleep walking: {Blank single:::\"Yes\",\"No\"}  Sleep talking:  {Blank single:::\"Yes\",\"No\"}  Dream enactment: {Blank single:::\"Yes\",\"No\"}  Bruxism: {Blank single:::\"Yes\",\"No\"}  Nocturnal excessive sweating: {Blank single:::\"Yes\",\"No\"}  Nocturnal GERD: {Blank single:::\"Yes\",\"No\"}  Morning headaches: {Blank single:::\"Yes\",\"No\"}  Morning dry mouth/sore throat: {Blank single:::\"Yes\",\"No\"}  Nocturia: {Blank single:::\"Yes\",\"No\"}  Restless sleep: {Blank single:::\"Yes\",\"No\"}  Falls from bed during sleep: {Blank single:::\"Yes\",\"No\"}  Sleep paralysis: {Blank single:77228::\"Yes\",\"No\"}  Hypnagogic/hypnopompic hallucinations: {Jeannine single:::\"Yes\",\"No\"}  Bedroom environment is conducive to sleep: {Jeannine single:::\"Yes\",\"No\"}    DAYTIME SYMPTOMS  [place]: ***  Daytime sleepiness: {Jeannine single:::\"Frequent\",\"Sometimes\",\"Occasionally\",\"No\"}  Fatigue: {Jeannine single:::\"Yes\",\"No\"}  Trouble with memory/concentration: {Jeannine single:::\"Yes\",\"No\"}  Dozing: {Jeannine single:::\"Occasionally\",\"No\"}  Feeling sleepy while driving: {Jeannine single:::\"Frequently\",\"Occasionally\",\"Denies\"}  Fallen asleep while driving: {Blank " "single:83795::\"Denies\"}  Close calls related to sleepiness and driving: {Blank single:19197::\"Denies\"}  Accidents related to sleepiness and driving: {Blank single:19197::\"Denies\"}    RLS symptoms: {Blank single:79397::\"Yes\",\"No\"} ***  Bed partner mentions pt kicks in sleep: {Blank single:19197::\"Yes\",\"No\"}    Cataplexy: {Blank single:25950::\"Yes\",\"No\"}    SLEEP HABITS:   Self-described morning/***night person  Preferred sleep position: {DESC; PRONE / SUPINE / LATERAL:26498}  Bedtime: ***, sleep latency ***  Wake time: ***  # of nocturnal awakenings: *** due to ***  Napping: ***. Napping is***not refreshing  Total estimated sleep per 24 hrs: *** hours    PRIOR SLEEP STUDIES:  HST 4/26/2021: weight 295 lbs, BMI 47.6. Sleep complaints of waking up gasping/choking, grunting, and sleep paralysis. Study was non-diagnostic for sleep apnea with an REI3% of 4.3/h, supine REI3% showed mild ARSALAN at 7.7/h (during the 15.5 minutes spent supine), nonsupine REI3% nondiagnostic at 4.2/h.  Respiratory events generally occurred in 2 clusters, suggesting a REM sleep predominance, though this cannot be confirmed due to lack of EEG monitoring on the study.  Mean SpO2 94.5%, luz 85%, and <=88% for 30.8 minutes.  Study report and hypnogram were reviewed personally by me.    PRIOR TREATMENTS:  doxylamine succinate and melatonin - no benefit  Sound machines    Patient Active Problem List   Diagnosis    Abnormal uterine bleeding (AUB)    Dysmenorrhea    Bacterial vaginosis    Bariatric surgery status    Benign essential hypertension    Chest pain    COVID-19    Dizziness    Morbid obesity due to excess calories (CMS/HCC)    Jaw pain    Otalgia    Pain of right heel    Sleep apnea    Smoker    Suspected sleep apnea    Urinary tract infection    Vaginal discharge    Vitamin D deficiency    Condition not found    HPV in female    Hypercholesterolemia    LGSIL Pap smear of vagina    Annual physical exam    Insomnia    Low vitamin D level "     Past Medical History:   Diagnosis Date    Acute bronchitis due to other specified organisms 2020    Acute viral bronchitis    Acute sinusitis 2024    Acute URI 2024    Candidiasis, unspecified 2016    Yeast infection    Congestion of nasal sinus 2024    Encounter for pregnancy test, result negative     Pregnancy examination or test, negative result    Encounter for pregnancy test, result unknown     Pregnancy examination or test, pregnancy unconfirmed    Essential (primary) hypertension 2018    White coat syndrome with hypertension    Hirsutism 2016    Hirsutism    Low grade squamous intraepithelial lesion on cytologic smear of vagina (LGSIL) 2016    LGSIL Pap smear of vagina    Near syncope 2024    Palpitations 2024    Papillomavirus as the cause of diseases classified elsewhere 2016    HPV in female    Personal history of other diseases of the female genital tract 2016    History of ovarian cyst    Personal history of other endocrine, nutritional and metabolic disease     History of hypercholesterolemia    Personal history of other endocrine, nutritional and metabolic disease 2015    History of obesity    Personal history of other infectious and parasitic diseases     History of chicken pox    Personal history of other specified conditions 2020    History of fatigue    Personal history of other specified conditions     History of abnormal Pap smear    Unspecified tubal pregnancy without intrauterine pregnancy     Ectopic pregnancy, tubal     Past Surgical History:   Procedure Laterality Date     SECTION, CLASSIC  2016     Section    COLONOSCOPY  2016    Colonoscopy (Fiberoptic)    DILATION AND CURETTAGE OF UTERUS  2016    Dilation And Curettage    LAPAROSCOPY DIAGNOSTIC / BIOPSY / ASPIRATION / LYSIS  2016    Exploratory Laparoscopy    OTHER SURGICAL HISTORY  2016    Colposcopy  Cervix With Biopsy(S)    OTHER SURGICAL HISTORY  05/02/2016    Laparoscopy With Excision Of Ectopic Pregnancy    TUBAL LIGATION  05/02/2016    Tubal Ligation     Current Outpatient Medications   Medication Sig Dispense Refill    amLODIPine (Norvasc) 10 mg tablet Take 1 tablet (10 mg) by mouth once daily. 90 tablet 0    doxylamine succinate (NITETIME SLEEP-AID ORAL) Take 2 tablets by mouth once daily at bedtime.      ergocalciferol (Vitamin D-2) 1.25 MG (66458 UT) capsule Take 1 capsule (50,000 Units) by mouth 1 (one) time per week. 12 capsule 0    losartan (Cozaar) 25 mg tablet Take 1 tablet (25 mg) by mouth once daily. 90 tablet 0    nicotine (Nicoderm CQ) 21 mg/24 hr patch Place 1 patch on the skin once every 24 hours. 45 patch 0    omeprazole (PriLOSEC) 20 mg DR capsule Take 1 capsule (20 mg) by mouth once daily in the morning. Take before meals. Do not crush or chew.      traZODone (Desyrel) 50 mg tablet Take 1 tablet (50 mg) by mouth as needed at bedtime for sleep. 30 tablet 0    varenicline (Chantix) 1 mg tablet Take 1 tablet (1 mg) by mouth 2 times a day. Take with full glass of water. 60 tablet 2     No current facility-administered medications for this visit.     Allergies   Allergen Reactions    Lisinopril Cough       FAMILY HISTORY OF SLEEP DISORDERS: ***  Family History   Problem Relation Name Age of Onset    Depression Mother          with anxiety    Diabetes type II Father         SOCIAL HISTORY  Employment: ***  Lives with: ***  Alcohol: ***  Cigarettes: ***  Illicits: ***  Caffeine: ***     ROS: 12 point ROS ***.    PHYSICAL EXAMINATION:   There were no vitals filed for this visit.  There is no height or weight on file to calculate BMI.  General: Awake. Alert. Comfortable. No apparent distress. ***  Speech: Normal  Comprehension: Normal  Mood: Stable  Affect: Appropriate  Eyes:   Eyelids: normal            ENT:          Nares {Actions; are/are not:40334} patent bilaterally. Septum deviation  "***absent. Salmon tongue position {Blank single:75476::\"I\",\"II\",\"III\",\"IV\"}. Tongue scalloping {is/is not:73864} present, tongue {is/is not:27362} enlarged, soft palate {is/is not:00059} elongated, hard palate {is/is not:25565} high arched. Uvula {is/is not:95160} enlarged. Retrognathia {is/is not:29045} present. Tonsils are {Blank single:19197::\"1+\",\"2+\",\"3+\",\"4+\",\"not enlarged\",\"surgically absent\"}. Dentition ***.           Neck:          Circumference: ***  Cardiac: Regular in rate and rhythm. No murmurs. *** unable to assess pulses or cardiac rate/rhythm. No edema in bilateral lower extremities.***  Pul:         Clear to auscultation bilaterally.*** Normal respiratory effort   Abd:         ***obese  Neuro: Alert, well-oriented. Cranial nerves II-XII grossly normal and symmetric.  Moves all limbs symmetrically with no evidence of significant focal weakness. No abnormal movements noted. Normal gait***      CPAP download:  DME: ***  Setup date: ***  Date range: ***  Days used: ***%  Days used >4 hours: ***%  Average usage (days used): ***  Setting: *** cm H2O  Pressure: 95th %ile *** cm H2O, median *** cm H2O, max *** cm H2O  Leak: *** L/min (95th %ile)  AHI: ***      LABS/DIAGNOSTICS:  Lab Results   Component Value Date    HGB 14.8 01/18/2024    CO2 26 01/18/2024    TSH 1.40 01/18/2024    FREET4 0.90 04/15/2021    HGBA1C 4.8 01/18/2024    FERRITIN 109 04/15/2021    IRON 203 (H) 04/15/2021    TIBC 359 04/15/2021    IRONSAT 57 (H) 04/15/2021    VITD25 10 (L) 01/18/2024    [id number] 346 01/18/2024        Echo: EF ***  MRI brain/CT head: ***  PFTs: ***      ASSESSMENT AND PLAN: Ms. Lala Smart is a 33 y.o. female with a history of {Blank multiple:19196::\"snoring\",\"witnessed apneas\",\"nocturnal gasping\",\"nocturnal choking\",\"restless sleep\",\"frequent night time awakenings\",\"fatigue\",\"excessive daytime sleepiness\"}.   She has a {Blank single:19197::\"crowded\"} oropharynx, a neck circumference of *** inches, and a " "BMI of *** kg/m2.    Her medical history is significant for {Jeannine multiple:14883::\"hypertension\",\"diabetes mellitus\"}.   She is at risk for ARSALAN. Untreated ARSALAN can lead to cardiovascular and metabolic complications. Further evaluation with {Jeannine single:09613::\"a sleep study is recommended. This can be performed at home or in the sleep laboratory. A negative home sleep study would necessitate an in-laboratory sleep study\",\"an in-laboratory sleep study is recommended\",\"a home sleep study is recommended. A negative home sleep study would necessitate an in-laboratory sleep study\"}.       #sleep disordered breathing  -We discussed the risk factors for sleep apnea, pathophysiology of sleep apnea, treatment options, and potential long-term complications of untreated ARSALAN, including cardiovascular and metabolic complications. We will start evaluation with a*** home sleep test.       All of the above was discussed with the {Jeannnie single:95487::\"patient and her partner\",\"patient and her family\",\"patient\"} in detail. {Jeannine single:60522::\"They voiced an understanding of the above. Patient prefers to get tested ***\",\"She voiced an understanding of the above and prefers to get tested ***\",\"They voiced an understanding of the above. Patient was agreeable to proceed further as advised\",\"She voiced an understanding of the above and was agreeable to proceed further as advised\"}. Procedure for the sleep study was discussed with her.    Around {Blank single:70889::\"60 minutes\",\"45 minutes\",\"30 minutes\"} were spent on this encounter, including time reviewing the chart, conducting the H&P, counseling the patient, and documenting/placing orders.    FOLLOW UP:  {Blank single:88316::\"After study to discuss results\"}  "

## 2024-02-07 ENCOUNTER — APPOINTMENT (OUTPATIENT)
Dept: SLEEP MEDICINE | Facility: CLINIC | Age: 34
End: 2024-02-07
Payer: COMMERCIAL

## 2024-02-15 ENCOUNTER — OFFICE VISIT (OUTPATIENT)
Dept: SLEEP MEDICINE | Facility: CLINIC | Age: 34
End: 2024-02-15
Payer: COMMERCIAL

## 2024-02-15 VITALS
BODY MASS INDEX: 52.13 KG/M2 | DIASTOLIC BLOOD PRESSURE: 90 MMHG | OXYGEN SATURATION: 98 % | HEART RATE: 98 BPM | WEIGHT: 293 LBS | SYSTOLIC BLOOD PRESSURE: 157 MMHG

## 2024-02-15 DIAGNOSIS — I10 HYPERTENSION, UNSPECIFIED TYPE: ICD-10-CM

## 2024-02-15 DIAGNOSIS — G47.30 SLEEP-DISORDERED BREATHING: ICD-10-CM

## 2024-02-15 DIAGNOSIS — F17.210 CIGARETTE NICOTINE DEPENDENCE WITHOUT COMPLICATION: ICD-10-CM

## 2024-02-15 DIAGNOSIS — G47.8 SLEEP PARALYSIS: ICD-10-CM

## 2024-02-15 DIAGNOSIS — E66.01 MORBID OBESITY DUE TO EXCESS CALORIES (MULTI): ICD-10-CM

## 2024-02-15 DIAGNOSIS — F51.04 CHRONIC INSOMNIA: Primary | ICD-10-CM

## 2024-02-15 PROCEDURE — 3077F SYST BP >= 140 MM HG: CPT | Performed by: PSYCHIATRY & NEUROLOGY

## 2024-02-15 PROCEDURE — 99205 OFFICE O/P NEW HI 60 MIN: CPT | Performed by: PSYCHIATRY & NEUROLOGY

## 2024-02-15 PROCEDURE — 3080F DIAST BP >= 90 MM HG: CPT | Performed by: PSYCHIATRY & NEUROLOGY

## 2024-02-15 PROCEDURE — 4004F PT TOBACCO SCREEN RCVD TLK: CPT | Performed by: PSYCHIATRY & NEUROLOGY

## 2024-02-15 ASSESSMENT — PATIENT HEALTH QUESTIONNAIRE - PHQ9
SUM OF ALL RESPONSES TO PHQ9 QUESTIONS 1 AND 2: 0
2. FEELING DOWN, DEPRESSED OR HOPELESS: NOT AT ALL
1. LITTLE INTEREST OR PLEASURE IN DOING THINGS: NOT AT ALL

## 2024-02-15 NOTE — PROGRESS NOTES
Patient: Lala Smart    [id number]  : 1990 -- AGE 33 y.o.    Provider: Aryan Arellano MD     [facility]   Service Date: 2024              [facility]  New Visit Note        The patient's referring provider is: Lata Cuenca PA-C    HPI:  Lala Smart is a 33 y.o. female HTN, HLD, morbid obesity, vitamin D deficiency, COVID-19, insomnia, and nicotine dependence, who presents today for suspected sleep apnea (had a nondiagnostic home sleep study in ) and insomnia, who presents today for evaluation.    Hard to fall asleep for her whole life. Runs in the family. Mind is running, she is over-thinking, brain does not shut off, has anxiety, cannot sleep despite feeling sleepy. Looks forward to going to bed, loves going to bed, loves sleeping, has great dreams. Starting taking OTC sleep aids at age 14 years old, without them she would be up until 1-2 AM. Worked well up until a few months ago. Has been taking 50 mg of doxylamine. Recently started on trazodone 50 mg at night, which has been a great help to fall asleep and she is repositioning much less during her sleep. Her sleep is also difficult due to her 's snoring. Sometimes wakes up and checks the clock, which stresses her out.    Rare sleep paralysis coinciding occasionally with hearing a buzzing noise in her head, can occur when she takes a nap (rarely naps). When her body is paralyzed she feels like/hears that her kids are walking around the house when they are really not home, and once felt like she is floating, and occasionally has had an incubus experience.    NIGHTTIME SYMPTOMS:   Snoring: states that her  tells her she snores. Started probably 4-5 years ago when she gained a lot of weight. Thinks it is quiet.   states she grunts in her sleep and she has had grunting in sleep that wakes her.  Witnessed apnea: no  Nocturnal gasping/choking: not obviously  happening from her perspective  Sleep walking: No  Sleep talking:  No  Dream enactment: No  Morning headaches: No  Morning dry mouth/sore throat: No  Nocturia: No  Restless sleep:  tells her she changes positions frequently, but she does not feel her sleep is restless  Bedroom environment is conducive to sleep: No - always has the TV on playing background noise (eg. Cartoons). Has a fan on.    DAYTIME SYMPTOMS  [place]: 1/24  Insomnia Severity Index: 15/28  Daytime sleepiness: None since taking trazodone, had sleep prior when taking doxylamine  Fatigue: None since taking trazodone, had sleep prior when taking doxylamine  Trouble with memory/concentration: No  Dozing: No  Feeling sleepy while driving: Denies    RLS symptoms: No   Cataplexy: No    SLEEP HABITS:   Self-described neither a morning person nor a night person. Likes to sleep in, but does not like to stay up late  Preferred sleep position: side or prone  Bedtime: 9:30 pm, takes her sleep aid, sleep latency within 30 minutes  Wake time: 7-8 am  Napping: rare.   Total estimated sleep per 24 hrs: 8-9 hours    PRIOR SLEEP STUDIES:  HST 4/26/2021: weight 295 lbs, BMI 47.6. Sleep complaints of waking up gasping/choking, grunting, and sleep paralysis. Study was non-diagnostic for sleep apnea with an REI3% of 4.3/h, supine REI3% showed mild ARSALAN at 7.7/h (during the 15.5 minutes spent supine), nonsupine REI3% nondiagnostic at 4.2/h.  Respiratory events generally occurred in 2 clusters, suggesting a REM sleep predominance, though this cannot be confirmed due to lack of EEG monitoring on the study.  Mean SpO2 94.5%, luz 85%, and <=88% for 30.8 minutes.  Study report and hypnogram were reviewed personally by me.     PRIOR TREATMENTS:  Melatonin - ineffective    Patient Active Problem List   Diagnosis    Abnormal uterine bleeding (AUB)    Dysmenorrhea    Bacterial vaginosis    Bariatric surgery status    Benign essential hypertension    Chest pain    COVID-19     Dizziness    Morbid obesity due to excess calories (CMS/[facility])    Jaw pain    Otalgia    Pain of right heel    Sleep apnea    Smoker    Suspected sleep apnea    Urinary tract infection    Vaginal discharge    Vitamin D deficiency    Condition not found    HPV in female    Hypercholesterolemia    LGSIL Pap smear of vagina    Annual physical exam    Insomnia    Low vitamin D level     Past Medical History:   Diagnosis Date    Acute bronchitis due to other specified organisms 03/30/2020    Acute viral bronchitis    Acute sinusitis 01/18/2024    Acute URI 01/18/2024    Candidiasis, unspecified 06/13/2016    Yeast infection    Congestion of nasal sinus 01/18/2024    Encounter for pregnancy test, result negative     Pregnancy examination or test, negative result    Encounter for pregnancy test, result unknown     Pregnancy examination or test, pregnancy unconfirmed    Essential (primary) hypertension 11/27/2018    White coat syndrome with hypertension    Hirsutism 06/07/2016    Hirsutism    Low grade squamous intraepithelial lesion on cytologic smear of vagina (LGSIL) 06/02/2016    LGSIL Pap smear of vagina    Near syncope 01/18/2024    Palpitations 01/18/2024    Papillomavirus as the cause of diseases classified elsewhere 06/02/2016    HPV in female    Personal history of other diseases of the female genital tract 05/02/2016    History of ovarian cyst    Personal history of other endocrine, nutritional and metabolic disease     History of hypercholesterolemia    Personal history of other endocrine, nutritional and metabolic disease 05/06/2015    History of obesity    Personal history of other infectious and parasitic diseases     History of chicken pox    Personal history of other specified conditions 02/25/2020    History of fatigue    Personal history of other specified conditions     History of abnormal Pap smear    Unspecified tubal pregnancy without intrauterine pregnancy     Ectopic pregnancy, tubal     Past  Surgical History:   Procedure Laterality Date     SECTION, CLASSIC  2016     Section    COLONOSCOPY  2016    Colonoscopy (Fiberoptic)    DILATION AND CURETTAGE OF UTERUS  2016    Dilation And Curettage    LAPAROSCOPY DIAGNOSTIC / BIOPSY / ASPIRATION / LYSIS  2016    Exploratory Laparoscopy    OTHER SURGICAL HISTORY  2016    Colposcopy Cervix With Biopsy(S)    OTHER SURGICAL HISTORY  2016    Laparoscopy With Excision Of Ectopic Pregnancy    TONSILLECTOMY Bilateral     circa 2018    TUBAL LIGATION  2016    Tubal Ligation     Current Outpatient Medications   Medication Sig Dispense Refill    amLODIPine (Norvasc) 10 mg tablet Take 1 tablet (10 mg) by mouth once daily. 90 tablet 0    doxylamine succinate (NITETIME SLEEP-AID ORAL) Take 2 tablets by mouth once daily at bedtime.      ergocalciferol (Vitamin D-2) 1.25 MG (36138 UT) capsule Take 1 capsule (50,000 Units) by mouth 1 (one) time per week. 12 capsule 0    losartan (Cozaar) 25 mg tablet Take 1 tablet (25 mg) by mouth once daily. 90 tablet 0    nicotine (Nicoderm CQ) 21 mg/24 hr patch Place 1 patch on the skin once every 24 hours. 45 patch 0    omeprazole (PriLOSEC) 20 mg DR capsule Take 1 capsule (20 mg) by mouth once daily in the morning. Take before meals. Do not crush or chew.      traZODone (Desyrel) 50 mg tablet Take 1 tablet (50 mg) by mouth as needed at bedtime for sleep. 30 tablet 0    varenicline (Chantix) 1 mg tablet Take 1 tablet (1 mg) by mouth 2 times a day. Take with full glass of water. 60 tablet 2     No current facility-administered medications for this visit.     Allergies   Allergen Reactions    Lisinopril Cough       FAMILY HISTORY OF SLEEP DISORDERS:   Family History   Problem Relation Name Age of Onset    Depression Mother          with anxiety    Diabetes type II Father         SOCIAL HISTORY  Employment: hemodialysis tech, works every other week  Lives with:  and 3 children  "(18, 13, and 10 yo)  Alcohol: occasional 1-2 drinks  Cigarettes: down from 1 ppd to 0.5 ppd with Chantix  Illicits: No  Caffeine: none     ROS: 12 point ROS positive for nasal congestion in the morning and due to dry air she has had some blood in her mucus when she blows her nose.  Also positive for SOB with exercise, weight gain, and anxiety.  All items/systems were reviewed and are negative..     PHYSICAL EXAMINATION:   Vitals:    02/15/24 1108   BP: 157/90   BP Location: Left arm   Patient Position: Sitting   BP Cuff Size: Large adult   Pulse: 98   SpO2: 98%   Weight: 147 kg (323 lb)     Body mass index is 52.13 kg/m².  General: Awake. Alert. Comfortable. No apparent distress.   Speech: Normal  Comprehension: Normal  Mood: Stable  Affect: Appropriate  Eyes:   Eyelids: normal            ENT:          Inferior nasal turbinates are mildly inflamed bilaterally. Septum deviation absent. Salmon tongue position class III-IV. Tongue scalloping is present, tongue is enlarged, soft palate is not elongated, hard palate is not high arched. Uvula is not enlarged. Retrognathia is not present. Tonsils are not enlarged. Dentition good.           Neck:          Circumference: 17\"  Cardiac: Regular in rate and rhythm. No murmurs. No edema in bilateral lower extremities.  Pul:         Clear to auscultation bilaterally. Normal respiratory effort   Abd:         obese  Neuro: Alert, well-oriented. Cranial nerves II-XII grossly normal and symmetric.  Moves all limbs symmetrically with no evidence of significant focal weakness. No abnormal movements noted. Normal gait      LABS/DIAGNOSTICS:  Lab Results   Component Value Date    HGB 14.8 01/18/2024    CO2 26 01/18/2024    TSH 1.40 01/18/2024    FREET4 0.90 04/15/2021    HGBA1C 4.8 01/18/2024    FERRITIN 109 04/15/2021    IRON 203 (H) 04/15/2021    TIBC 359 04/15/2021    IRONSAT 57 (H) 04/15/2021    VITD25 10 (L) 01/18/2024    [id number] 346 01/18/2024        Echo: never  MRI brain/CT " head: never  PFTs: never      ASSESSMENT AND PLAN: Ms. Lala Smart is a 33 y.o. female with a history of chronic insomnia with difficulty falling asleep (has TV on, spends excessive time awake in bed, has anxiety, female sex, and strong family history of sleep onset insomnia), with 2 decades of using doxylamine that has been ineffective in the last few months and now has been deriving benefit from trazodone 50 mg, and she has snoring and rare sleep paralysis. She had a home sleep study in 2021 that was non-diagnostic for sleep apnea, but home sleep testing can underestimate the severity of sleep disordered breathing compared to in-lab testing.    #chronic insomnia - likely multifactorial, as described above.   -improved with trazodone 50 mg, PCP prescribing.   -reviewed sleep hygiene, stimulus control and relaxation techniques in detail  -Advised pt to see if improving her sleep habits allows her to wean off of her sleep aid  -may be exacerbated by untreated sleep apnea - will do an in-lab sleep study to assess for sleep apnea    #sleep disordered breathing  -We discussed the risk factors for sleep apnea, pathophysiology of sleep apnea, treatment options, and potential long-term complications of untreated ARSALAN, including cardiovascular and metabolic complications. We will start evaluation with a split night in-lab sleep test.     #HTN  -management per PCP    #nicotine dependence  -quitting    #morbid obesity  -weight loss encouraged    #sleep paralysis  -educated pt that typical triggers are sleep deprivation, stress, psychological trauma, alcohol, sleep apnea, and sleeping supine, and educated her that it is a benign phenomenon due to the paralysis not lifting quickly enough when she is waking from REM sleep      All of the above was discussed with the patient in detail. She voiced an understanding of the above and was agreeable to proceed further as advised. Procedure for the sleep study was discussed with  her.    60 minutes were spent on this encounter, including time reviewing the chart, conducting the H&P, counseling the patient, and documenting/placing orders.    FOLLOW UP:  After study to discuss results

## 2024-02-19 ENCOUNTER — OFFICE VISIT (OUTPATIENT)
Dept: PRIMARY CARE | Facility: CLINIC | Age: 34
End: 2024-02-19
Payer: COMMERCIAL

## 2024-02-19 VITALS — DIASTOLIC BLOOD PRESSURE: 86 MMHG | SYSTOLIC BLOOD PRESSURE: 148 MMHG | HEART RATE: 93 BPM | OXYGEN SATURATION: 98 %

## 2024-02-19 DIAGNOSIS — G47.00 INSOMNIA, UNSPECIFIED TYPE: ICD-10-CM

## 2024-02-19 DIAGNOSIS — F17.200 SMOKER: ICD-10-CM

## 2024-02-19 DIAGNOSIS — Z71.6 ENCOUNTER FOR SMOKING CESSATION COUNSELING: Primary | ICD-10-CM

## 2024-02-19 PROBLEM — F32.A DEPRESSIVE DISORDER: Status: ACTIVE | Noted: 2024-02-19

## 2024-02-19 PROBLEM — R53.83 FATIGUE: Status: RESOLVED | Noted: 2024-02-19 | Resolved: 2024-02-19

## 2024-02-19 PROBLEM — M25.539 PAIN IN WRIST: Status: RESOLVED | Noted: 2024-02-19 | Resolved: 2024-02-19

## 2024-02-19 PROBLEM — O00.109 TUBAL ECTOPIC PREGNANCY (HHS-HCC): Status: RESOLVED | Noted: 2024-02-19 | Resolved: 2024-02-19

## 2024-02-19 PROBLEM — Z86.19 HISTORY OF VARICELLA: Status: RESOLVED | Noted: 2024-02-19 | Resolved: 2024-02-19

## 2024-02-19 PROBLEM — S69.90XA INJURY OF WRIST: Status: RESOLVED | Noted: 2024-02-19 | Resolved: 2024-02-19

## 2024-02-19 PROCEDURE — 3079F DIAST BP 80-89 MM HG: CPT

## 2024-02-19 PROCEDURE — 3008F BODY MASS INDEX DOCD: CPT

## 2024-02-19 PROCEDURE — 99213 OFFICE O/P EST LOW 20 MIN: CPT

## 2024-02-19 PROCEDURE — 3077F SYST BP >= 140 MM HG: CPT

## 2024-02-19 PROCEDURE — 4004F PT TOBACCO SCREEN RCVD TLK: CPT

## 2024-02-19 ASSESSMENT — ENCOUNTER SYMPTOMS
DIARRHEA: 0
FEVER: 0
VOMITING: 0
NAUSEA: 0
DIZZINESS: 0
SHORTNESS OF BREATH: 0
HEADACHES: 0
FATIGUE: 0
CHILLS: 0

## 2024-02-19 ASSESSMENT — PAIN SCALES - GENERAL: PAINLEVEL: 7

## 2024-02-19 NOTE — PROGRESS NOTES
Subjective   Patient ID: Lala Smart is a 33 y.o. female who presents for Follow-up (1 month ).    HPI   33 y.o. female with PMH of HTN, Hypercholesterolemia, obesity, self-reported ADHD, current tobacco smoker, and suspected ARSALAN is here today for follow up for smoking cessation and insomnia. Patient reports the Chantix is helping and is down to 1/2 ppd, sometimes 5-6 cigarettes per day. Has not used the nicotine patch yet. Patient also reports she saw sleep medicine and is scheduled for a sleep test. Patient reports trazadone has helped and has been getting the most restful sleep she has had yet. Patient would like to revisit weight loss surgery after sleep testing and after quitting smoking. Patient mentions skin tags on neck and armpit.     Review of Systems   Constitutional:  Negative for chills, fatigue and fever.   Respiratory:  Negative for shortness of breath.    Cardiovascular:  Negative for chest pain.   Gastrointestinal:  Negative for diarrhea, nausea and vomiting.   Skin:         Skin tags on neck and armpit   Neurological:  Negative for dizziness and headaches.   All other systems reviewed and are negative.      Objective   /86   Pulse 93   SpO2 98%     Physical Exam  Vitals reviewed.   Constitutional:       Appearance: Normal appearance. She is obese.   HENT:      Head: Normocephalic and atraumatic.   Cardiovascular:      Rate and Rhythm: Normal rate and regular rhythm.      Pulses: Normal pulses.      Heart sounds: Normal heart sounds.   Pulmonary:      Effort: Pulmonary effort is normal.      Breath sounds: Normal breath sounds.   Musculoskeletal:      Cervical back: Normal range of motion and neck supple.   Skin:     Comments: Skin tags noted on neck and right axilla   Neurological:      General: No focal deficit present.      Mental Status: She is alert and oriented to person, place, and time.   Psychiatric:         Mood and Affect: Mood normal.         Behavior: Behavior normal.          Thought Content: Thought content normal.         Judgment: Judgment normal.         Assessment/Plan   Problem List Items Addressed This Visit             ICD-10-CM    Smoker F17.200    Insomnia G47.00    Encounter for smoking cessation counseling - Primary Z71.6    BMI 50.0-59.9, adult (CMS/HCC) Z68.43     - Discussed follow up after 12 weeks of smoking cessation treatment. Discussed side effects of smoking while using the patch.  - Discussed follow up for sleep study and will revisit use of trazadone for sleep aid. Encouraged continual work on sleep and lifestyle habits.  - Will revisit weight loss surgery at follow up visit.   - Patient to follow up in 2 months.

## 2024-02-22 DIAGNOSIS — G47.00 INSOMNIA, UNSPECIFIED TYPE: ICD-10-CM

## 2024-02-22 DIAGNOSIS — R79.89 LOW VITAMIN D LEVEL: ICD-10-CM

## 2024-02-22 RX ORDER — TRAZODONE HYDROCHLORIDE 50 MG/1
50 TABLET ORAL NIGHTLY PRN
Qty: 30 TABLET | Refills: 0 | Status: SHIPPED | OUTPATIENT
Start: 2024-02-22 | End: 2024-03-19

## 2024-02-22 RX ORDER — ERGOCALCIFEROL 1.25 MG/1
50000 CAPSULE ORAL
Qty: 12 CAPSULE | Refills: 0 | OUTPATIENT
Start: 2024-02-22

## 2024-03-11 ENCOUNTER — TELEPHONE (OUTPATIENT)
Dept: PRIMARY CARE | Facility: CLINIC | Age: 34
End: 2024-03-11
Payer: COMMERCIAL

## 2024-03-11 DIAGNOSIS — R30.0 DYSURIA: Primary | ICD-10-CM

## 2024-03-11 RX ORDER — NITROFURANTOIN 25; 75 MG/1; MG/1
100 CAPSULE ORAL 2 TIMES DAILY
Qty: 14 CAPSULE | Refills: 0 | Status: SHIPPED | OUTPATIENT
Start: 2024-03-11 | End: 2024-03-18

## 2024-03-18 ENCOUNTER — LAB (OUTPATIENT)
Dept: LAB | Facility: LAB | Age: 34
End: 2024-03-18
Payer: COMMERCIAL

## 2024-04-03 ENCOUNTER — APPOINTMENT (OUTPATIENT)
Dept: SLEEP MEDICINE | Facility: CLINIC | Age: 34
End: 2024-04-03
Payer: COMMERCIAL

## 2024-04-10 ENCOUNTER — TELEPHONE (OUTPATIENT)
Dept: SLEEP MEDICINE | Facility: CLINIC | Age: 34
End: 2024-04-10
Payer: COMMERCIAL

## 2024-04-10 DIAGNOSIS — F51.04 CHRONIC INSOMNIA: ICD-10-CM

## 2024-04-10 DIAGNOSIS — I10 HYPERTENSION, UNSPECIFIED TYPE: ICD-10-CM

## 2024-04-10 DIAGNOSIS — G47.30 SLEEP-DISORDERED BREATHING: Primary | ICD-10-CM

## 2024-04-10 DIAGNOSIS — E66.01 MORBID OBESITY DUE TO EXCESS CALORIES (MULTI): ICD-10-CM

## 2024-04-10 NOTE — TELEPHONE ENCOUNTER
Pt called due to scheduling issues , work and sleep center, she is asking to do a home sleep test, would you be able to put in a order for a home sleep test instead of the in lab test

## 2024-04-26 DIAGNOSIS — I10 BENIGN ESSENTIAL HYPERTENSION: ICD-10-CM

## 2024-04-29 RX ORDER — LOSARTAN POTASSIUM 25 MG/1
25 TABLET ORAL DAILY
Qty: 90 TABLET | Refills: 0 | Status: SHIPPED | OUTPATIENT
Start: 2024-04-29 | End: 2024-05-01 | Stop reason: DRUGHIGH

## 2024-05-01 ENCOUNTER — OFFICE VISIT (OUTPATIENT)
Dept: PRIMARY CARE | Facility: CLINIC | Age: 34
End: 2024-05-01
Payer: COMMERCIAL

## 2024-05-01 VITALS
DIASTOLIC BLOOD PRESSURE: 96 MMHG | HEIGHT: 66 IN | OXYGEN SATURATION: 98 % | RESPIRATION RATE: 18 BRPM | HEART RATE: 88 BPM | BODY MASS INDEX: 52.13 KG/M2 | SYSTOLIC BLOOD PRESSURE: 148 MMHG

## 2024-05-01 DIAGNOSIS — R63.5 WEIGHT GAIN: ICD-10-CM

## 2024-05-01 DIAGNOSIS — L68.0 HIRSUTISM: ICD-10-CM

## 2024-05-01 DIAGNOSIS — I10 UNCONTROLLED HYPERTENSION: ICD-10-CM

## 2024-05-01 DIAGNOSIS — F17.200 SMOKER: ICD-10-CM

## 2024-05-01 DIAGNOSIS — N94.6 DYSMENORRHEA: ICD-10-CM

## 2024-05-01 DIAGNOSIS — Z71.6 ENCOUNTER FOR SMOKING CESSATION COUNSELING: ICD-10-CM

## 2024-05-01 PROCEDURE — 3080F DIAST BP >= 90 MM HG: CPT | Performed by: INTERNAL MEDICINE

## 2024-05-01 PROCEDURE — 4004F PT TOBACCO SCREEN RCVD TLK: CPT | Performed by: INTERNAL MEDICINE

## 2024-05-01 PROCEDURE — 3077F SYST BP >= 140 MM HG: CPT | Performed by: INTERNAL MEDICINE

## 2024-05-01 PROCEDURE — 3008F BODY MASS INDEX DOCD: CPT | Performed by: INTERNAL MEDICINE

## 2024-05-01 PROCEDURE — 99214 OFFICE O/P EST MOD 30 MIN: CPT | Performed by: INTERNAL MEDICINE

## 2024-05-01 RX ORDER — VARENICLINE TARTRATE 1 MG/1
1 TABLET, FILM COATED ORAL 2 TIMES DAILY
Qty: 60 TABLET | Refills: 2 | Status: SHIPPED | OUTPATIENT
Start: 2024-05-01 | End: 2024-07-30

## 2024-05-01 RX ORDER — LOSARTAN POTASSIUM 50 MG/1
50 TABLET ORAL DAILY
Qty: 90 TABLET | Refills: 1 | Status: SHIPPED | OUTPATIENT
Start: 2024-05-01 | End: 2024-05-21 | Stop reason: SDUPTHER

## 2024-05-01 RX ORDER — SEMAGLUTIDE 0.25 MG/.5ML
0.25 INJECTION, SOLUTION SUBCUTANEOUS
Qty: 2 ML | Refills: 0 | Status: SHIPPED | OUTPATIENT
Start: 2024-05-05 | End: 2024-05-27

## 2024-05-01 ASSESSMENT — ENCOUNTER SYMPTOMS
CONSTITUTIONAL NEGATIVE: 1
CARDIOVASCULAR NEGATIVE: 1
RESPIRATORY NEGATIVE: 1
MUSCULOSKELETAL NEGATIVE: 1
PSYCHIATRIC NEGATIVE: 1
NEUROLOGICAL NEGATIVE: 1

## 2024-05-01 ASSESSMENT — PAIN SCALES - GENERAL: PAINLEVEL: 0-NO PAIN

## 2024-05-01 ASSESSMENT — PATIENT HEALTH QUESTIONNAIRE - PHQ9
1. LITTLE INTEREST OR PLEASURE IN DOING THINGS: NOT AT ALL
SUM OF ALL RESPONSES TO PHQ9 QUESTIONS 1 AND 2: 0
2. FEELING DOWN, DEPRESSED OR HOPELESS: NOT AT ALL

## 2024-05-01 NOTE — PATIENT INSTRUCTIONS
It was great to see you in the office today! Here is what we discussed at your visit today:  As advised, I recommend that you see Dr. Habibeh Gitiforooz   [facility]  [address] [facility] [address]  GET DIRECTIONS  [phone]    As discussed, we will increase your Losartan to 50mg daily  Continue to Monitor your BP at home 2-3 times per week and call us with results after 3 weeks

## 2024-05-01 NOTE — PROGRESS NOTES
"Patient ID: She states she has been told by OB/GYN that she does not have PCOS. She states that she gets a lot of hair on her face and neck which she has had laser therapy for. She states that she has very painful menses. She states she had an ovary removed in the past which did not help at all with pain. She states that she gets \"fishing\" in her eyes regularly. She states she has never been this heavy in her life. The heaviest she was when she was pregnant was 260 pounds. She states that she was on Saxenda in the past and lost 50 pounds on it, she states she was on the highest dose. She is very interested in medication for weight loss. She states that she has a lot of sweating on her bilateral feet constantly. She states that she is also concerned about her BP being elevated. She admits that she had lost 50 pounds prior on weight loss medication, tolerated it well, and then went through stressful episode and gained the 50 pounds back and then some. She states she has been taking Trazodone for sleep which has been very effective and had recent sleep study which was negative for sleep apnea.     Chief Complaint   Patient presents with    Follow-up     Bp elevated  Weight loss injections or help with that  Feet sweat and hair growth on face       HPI Lala Smart is a 34 y.o. female with PMH remarkable for HTN, Hypercholesterolemia, obesity, self-reported ADHD, current tobacco smoker, and suspected ARSALAN who presents to the office today for check up.     HEALTH MAINTENANCE: FOLLOW UP   Last Office Visit: 2/19/24 with Lata Cuenca  Mammogram (40-75):   Pap smear (21-65, or hysterectomy q5yrs):  Last Labs: 1/18/24  Colonoscopy (45-75 or age 40 with 1st degree relative dx colon ca):  Lung cancer screening (50-78 y/o + 20 pack year + smoking/quit in last 15 years):   DEXA (65+, q 2 years):     Social History     Tobacco Use    Smoking status: Every Day     Current packs/day: 1.00     Average packs/day: 1 pack/day " "for 20.3 years (20.3 ttl pk-yrs)     Types: Cigarettes     Start date: 1/1/2004    Smokeless tobacco: Never    Tobacco comments:     Working on quitting - down to 1/2 pack or 5-6 per day   Substance Use Topics    Alcohol use: Yes    Drug use: Never     Review of Systems   Constitutional: Negative.    HENT: Negative.          + hirtuism   Respiratory: Negative.     Cardiovascular: Negative.    Genitourinary: Negative.    Musculoskeletal: Negative.    Neurological: Negative.    Psychiatric/Behavioral: Negative.       Visit Vitals  Vitals:    05/01/24 1048   BP: (!) 148/96   Pulse: 88   Resp: 18   SpO2: 98%   Height: 1.676 m (5' 6\")     Smoking Status Every Day     Physical Exam  Vitals reviewed.   Constitutional:       Appearance: Normal appearance.   HENT:      Head: Normocephalic and atraumatic.   Cardiovascular:      Rate and Rhythm: Normal rate and regular rhythm.      Pulses: Normal pulses.      Heart sounds: Normal heart sounds.   Pulmonary:      Effort: Pulmonary effort is normal.      Breath sounds: Normal breath sounds.   Abdominal:      General: Bowel sounds are normal.      Palpations: Abdomen is soft.   Musculoskeletal:         General: Normal range of motion.   Neurological:      General: No focal deficit present.      Mental Status: She is alert and oriented to person, place, and time.   Psychiatric:         Mood and Affect: Mood normal.         Behavior: Behavior normal.       Current Outpatient Medications   Medication Instructions    amLODIPine (NORVASC) 10 mg, oral, Daily    losartan (COZAAR) 25 mg, oral, Daily    nicotine (Nicoderm CQ) 21 mg/24 hr patch 1 patch, transdermal, Every 24 hours    omeprazole (PRILOSEC) 20 mg, oral, Daily before breakfast, Do not crush or chew.    traZODone (DESYREL) 50 mg, oral, Nightly PRN    varenicline (CHANTIX) 1 mg, oral, 2 times daily, Take with full glass of water.        Lab Results   Component Value Date    WBC 9.2 01/18/2024    HGB 14.8 01/18/2024    HCT 45.7 " 01/18/2024     01/18/2024    CHOL 251 (H) 01/18/2024    TRIG 99 01/18/2024    HDL 55.9 01/18/2024    ALT 55 (H) 01/18/2024    AST 35 01/18/2024     01/18/2024    K 4.0 01/18/2024     01/18/2024    CREATININE 0.75 01/18/2024    BUN 11 01/18/2024    CO2 26 01/18/2024    TSH 1.40 01/18/2024    INR 1.0 04/15/2021    HGBA1C 4.8 01/18/2024       ASSESSMENT AND PLAN:  Assessment/Plan   Diagnoses and all orders for this visit:  BMI 50.0-59.9, adult (Multi)  -     semaglutide, weight loss, (Wegovy) 0.25 mg/0.5 mL pen injector; Inject 0.25 mg under the skin 1 (one) time per week for 4 doses.    Uncontrolled hypertension  -     increase losartan (Cozaar) to 50 mg tablet; Take 1 tablet (50 mg) by mouth once daily.  -     continue with Amlodipine  -     advised to continue to Monitor your BP at home 2-3 times per week and call us with results after 3 weeks    Hirsutism, weight gain, dysmenorrhea, excessive sweating  - referred to OB in [place] for further workup to rule out PCOS    --------------------  Written by Anna Marie Cutler LPN, acting as a scribe for Dr. Arreola. This note accurately reflects the work and decisions made by Dr. Arreola.     I, Dr. Arreola, attest all medical record entries made by the scribe were under my direction and were personally dictated by me. I have reviewed the chart and agree that the record accurately reflects my performance of the history, physical exam, and assessment and plan.

## 2024-05-09 ENCOUNTER — TELEPHONE (OUTPATIENT)
Dept: PRIMARY CARE | Facility: CLINIC | Age: 34
End: 2024-05-09
Payer: COMMERCIAL

## 2024-05-15 DIAGNOSIS — R79.89 LOW VITAMIN D LEVEL: ICD-10-CM

## 2024-05-15 RX ORDER — ERGOCALCIFEROL 1.25 MG/1
50000 CAPSULE ORAL
Qty: 12 CAPSULE | Refills: 0 | Status: SHIPPED | OUTPATIENT
Start: 2024-05-19

## 2024-05-21 DIAGNOSIS — R60.9 EDEMA, UNSPECIFIED TYPE: Primary | ICD-10-CM

## 2024-05-21 DIAGNOSIS — I10 UNCONTROLLED HYPERTENSION: ICD-10-CM

## 2024-05-21 DIAGNOSIS — G47.00 INSOMNIA, UNSPECIFIED TYPE: ICD-10-CM

## 2024-05-21 RX ORDER — FUROSEMIDE 20 MG/1
20 TABLET ORAL DAILY
Qty: 30 TABLET | Refills: 0 | Status: SHIPPED | OUTPATIENT
Start: 2024-05-21 | End: 2025-05-21

## 2024-05-21 RX ORDER — TRAZODONE HYDROCHLORIDE 50 MG/1
50 TABLET ORAL NIGHTLY PRN
Qty: 90 TABLET | Refills: 0 | Status: SHIPPED | OUTPATIENT
Start: 2024-05-21

## 2024-05-21 RX ORDER — LOSARTAN POTASSIUM 50 MG/1
50 TABLET ORAL DAILY
Qty: 90 TABLET | Refills: 0 | Status: SHIPPED | OUTPATIENT
Start: 2024-05-21 | End: 2024-11-17

## 2024-06-13 DIAGNOSIS — R60.9 EDEMA, UNSPECIFIED TYPE: ICD-10-CM

## 2024-06-13 NOTE — PROGRESS NOTES
Subjective   Patient ID: Lala Smart is a 34 y.o. female who presents for No chief complaint on file..  HPI  BARIATRIC CONSULT  START WT: 319 IDEAL WT: 155 START EXCESS:  164  HT: 66  YRS OF OBESITY: since 2006  GREATEST WT LOSS IN LBS: 100  PROGRAMS FOR WT LOSS IN THE PAST : injections, calorie restriction, slim fast  PATIENT FRIEND WOLF MAY WILL BE HAVING SURGERY WITH DR. 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  Hepatitis A No.  Hepatitis B No.  Hepatitis C No.  Immunosuppressent drugs No.         HEENT:          Headache negative.         CARDIOLOGY:          History of Hyperlipidemia No.  Last stress test N/A.  Last echocardiogram N/A.  Chest pain No.  High blood pressure  yes .  Irregular heart beat No.  Known coronary artery disease No.  Pacemaker No.  Palpitations No.         RESPIRATORY:          Hx steroid use yes.  ER visits or Hospitalizations for breathing problems No.  Sleep Apnea insomnia.  CASILLAS (dyspnea on exertion) yes..  Hx of Asthma/COPD No.         GASTROENTEROLOGY:          Peptic ulcer No, Last EGD N/A, Last UGI N/A.  Colonoscopy Last Colonoscopy N/A.  Heartburn  yes.     ENDOCRINOLOGY:          Diabetes No.  Thyroid disorder No.         EXTREMITIES:          Varicose Veins No.  Stasis Ulcers No.  Ankle swelling  yes.  Personal history DVT No.  Personal history PE No.  Personal history of other thrombolic events No.  Family history of VTE No.  Known genetic bleeding or clotting disorder No.         FEMALE REPRODUCTIVE:          Uterine fibroids No.  Ovarian Cyst  yes. Infertility No.  Menstrual history Menopausal.         UROLOGY:          Kidney disease No.  Kidney stones No.  Previous UTIs No.  Urinary incontinence No.         MUSCULOSKELETAL:          Osteoporosis/Osteopenia No.  Arthritis No.  Joint pain yes.       SKIN:          Hidradenitis No.  Open skin wounds No.   Rosacea  yes.  Healing problems No.         PSYCHOLOGY:          Anxiety yes  Depression none.  Eating disorder denies.    Objective   Physical Exam    Assessment/Plan            Charlene Pringle LPN 06/13/24 4:32 PM

## 2024-06-14 RX ORDER — FUROSEMIDE 20 MG/1
20 TABLET ORAL DAILY
Qty: 90 TABLET | Refills: 0 | Status: SHIPPED | OUTPATIENT
Start: 2024-06-14

## 2024-06-21 ENCOUNTER — APPOINTMENT (OUTPATIENT)
Dept: SURGERY | Facility: CLINIC | Age: 34
End: 2024-06-21
Payer: COMMERCIAL

## 2024-06-21 VITALS
HEIGHT: 66 IN | HEART RATE: 105 BPM | DIASTOLIC BLOOD PRESSURE: 97 MMHG | WEIGHT: 293 LBS | BODY MASS INDEX: 47.09 KG/M2 | SYSTOLIC BLOOD PRESSURE: 171 MMHG

## 2024-06-21 DIAGNOSIS — I10 BENIGN ESSENTIAL HYPERTENSION: ICD-10-CM

## 2024-06-21 DIAGNOSIS — F17.200 SMOKER: ICD-10-CM

## 2024-06-21 DIAGNOSIS — E66.01 MORBID OBESITY WITH BMI OF 45.0-49.9, ADULT (MULTI): Primary | ICD-10-CM

## 2024-06-21 DIAGNOSIS — E78.00 HYPERCHOLESTEROLEMIA: ICD-10-CM

## 2024-06-21 DIAGNOSIS — R40.0 DAYTIME SOMNOLENCE: ICD-10-CM

## 2024-06-21 DIAGNOSIS — K21.9 GASTROESOPHAGEAL REFLUX DISEASE, UNSPECIFIED WHETHER ESOPHAGITIS PRESENT: ICD-10-CM

## 2024-06-21 PROCEDURE — 3080F DIAST BP >= 90 MM HG: CPT | Performed by: SURGERY

## 2024-06-21 PROCEDURE — 3008F BODY MASS INDEX DOCD: CPT | Performed by: SURGERY

## 2024-06-21 PROCEDURE — 99204 OFFICE O/P NEW MOD 45 MIN: CPT | Performed by: SURGERY

## 2024-06-21 PROCEDURE — 3077F SYST BP >= 140 MM HG: CPT | Performed by: SURGERY

## 2024-06-21 ASSESSMENT — PAIN SCALES - GENERAL: PAINLEVEL: 0-NO PAIN

## 2024-06-21 ASSESSMENT — LIFESTYLE VARIABLES
HOW OFTEN DO YOU HAVE SIX OR MORE DRINKS ON ONE OCCASION: NEVER
AUDIT-C TOTAL SCORE: 1
HOW MANY STANDARD DRINKS CONTAINING ALCOHOL DO YOU HAVE ON A TYPICAL DAY: 1 OR 2
HOW OFTEN DO YOU HAVE A DRINK CONTAINING ALCOHOL: MONTHLY OR LESS
SKIP TO QUESTIONS 9-10: 1

## 2024-06-26 PROBLEM — R40.0 DAYTIME SOMNOLENCE: Status: ACTIVE | Noted: 2024-06-26

## 2024-06-26 NOTE — H&P
History Of Present Illness  Lala Smart is a 34 y.o. woman here for consultation for bariatric surgery. She suffers from morbid obesity and has been overweight for many years and has a number of weight related comorbidities. She has attempted to lose weight several times over the years. She has had successes but has regained the weight at the completion of each of her dieting attempts. She is being referred for surgical intervention for her clinically significant morbid obesity.     -Bariatric Consultation:          START WT: 319 IDEAL WT: 155 START EXCESS:  164  HT: 66  YRS OF OBESITY: since 2006  GREATEST WT LOSS IN LBS: 100  PROGRAMS FOR WT LOSS IN THE PAST : injections, calorie restriction, slim fast     Past Medical History  She has a past medical history of Acute bronchitis due to other specified organisms (03/30/2020), Acute sinusitis (01/18/2024), Acute URI (01/18/2024), Anxiety, Candidiasis, unspecified (06/13/2016), Congestion of nasal sinus (01/18/2024), Encounter for pregnancy test, result negative, Encounter for pregnancy test, result unknown, Essential (primary) hypertension (11/27/2018), Fatigue (02/19/2024), GERD (gastroesophageal reflux disease) (2015), Hirsutism (06/07/2016), History of varicella (02/19/2024), HTN (hypertension), Injury of wrist (02/19/2024), Insomnia (2006), Insomnia, Low grade squamous intraepithelial lesion on cytologic smear of vagina (LGSIL) (06/02/2016), Near syncope (01/18/2024), Pain in wrist (02/19/2024), Palpitations (01/18/2024), Papillomavirus as the cause of diseases classified elsewhere (06/02/2016), Personal history of other diseases of the female genital tract (05/02/2016), Personal history of other endocrine, nutritional and metabolic disease, Personal history of other endocrine, nutritional and metabolic disease (05/06/2015), Personal history of other infectious and parasitic diseases, Personal history of other specified conditions (02/25/2020), Personal  history of other specified conditions, Snoring (), Tubal ectopic pregnancy (HHS-HCC) (2024), and Unspecified tubal pregnancy without intrauterine pregnancy (HHS-HCC).    Surgical History  She has a past surgical history that includes  section, classic (2016); Tubal ligation (2016); Other surgical history (2016); Laparoscopy diagnostic / biopsy / aspiration / lysis (2016); Other surgical history (2016); Dilation and curettage of uterus (2016); Colonoscopy (2016); and Tonsillectomy (Bilateral).     Social History  She reports that she has been smoking cigarettes. She started smoking about 20 years ago. She has a 20.5 pack-year smoking history. She has never used smokeless tobacco. She reports current alcohol use. She reports that she does not use drugs.    Family History  Family History   Problem Relation Name Age of Onset    Depression Mother Omi davis         with anxiety    COPD Mother Omi davis     Anxiety disorder Mother Omi davis     Diabetes type II Father      Obesity Father      Other (htn) Father      Other (morbid obesity) Sister      Other (morbid obesity) Brother          Allergies  Lisinopril    Review of Systems   Allergy/Immunologic:          HIV / AIDS No.  Hepatitis A No.  Hepatitis B No.  Hepatitis C No.  Immunosuppressent drugs No.         HEENT:          Headache negative.         CARDIOLOGY:          History of Hyperlipidemia No.  Last stress test N/A.  Last echocardiogram N/A.  Chest pain No.  High blood pressure  yes .  Irregular heart beat No.  Known coronary artery disease No.  Pacemaker No.  Palpitations No.         RESPIRATORY:          Hx steroid use yes.  ER visits or Hospitalizations for breathing problems No.  Sleep Apnea insomnia.  CASILLAS (dyspnea on exertion) yes..  Hx of Asthma/COPD No.         GASTROENTEROLOGY:          Peptic ulcer No, Last EGD N/A, Last UGI N/A.  Colonoscopy Last Colonoscopy N/A.  Heartburn  yes.      "ENDOCRINOLOGY:          Diabetes No.  Thyroid disorder No.         EXTREMITIES:          Varicose Veins No.  Stasis Ulcers No.  Ankle swelling  yes.  Personal history DVT No.  Personal history PE No.  Personal history of other thrombolic events No.  Family history of VTE No.  Known genetic bleeding or clotting disorder No.         FEMALE REPRODUCTIVE:          Uterine fibroids No.  Ovarian Cyst  yes. Infertility No.  Menstrual history Menopausal.         UROLOGY:          Kidney disease No.  Kidney stones No.  Previous UTIs No.  Urinary incontinence No.         MUSCULOSKELETAL:          Osteoporosis/Osteopenia No.  Arthritis No.  Joint pain yes.       SKIN:          Hidradenitis No.  Open skin wounds No.  Rosacea  yes.  Healing problems No.         PSYCHOLOGY:          Anxiety yes  Depression none.  Eating disorder denies.          Physical Exam       General Examination:         GENERAL APPEARANCE: alert and oriented x 3, Pleasant and cooperative, No Acute Distress.          HEENT: PERRLA.          NECK: no lymphadenopathy, no thyromegaly, no JVD, normal flexion, normal extension.          HEART: regular rate and rhythm.          LUNGS: clear to auscultation bilaterally.          CHEST: normal shape and expansion.          ABDOMEN: obese, no hernias present, soft and not tender, no guarding, no CVA tenderness.          EXTREMITIES: pulses 2 plus bilaterally, trace bilateral edema, no ulcerations.          SKIN: normal, no rash.          NEUROLOGIC EXAM: CN's II-XII grossly intact, gait normal.          BACK: no CVA tenderness.       Last Recorded Vitals  Blood pressure (!) 171/97, pulse 105, height 1.676 m (5' 6\"), weight 145 kg (319 lb).    Relevant Results           Assessment/Plan   Problem List Items Addressed This Visit             ICD-10-CM    Benign essential hypertension I10    Smoker F17.200    Hypercholesterolemia E78.00    Daytime somnolence R40.0     Other Visit Diagnoses         Codes    Morbid obesity " with BMI of 45.0-49.9, adult (Multi)    -  Primary E66.01, Z68.42    Relevant Orders    Referral to Psychology    Gastroesophageal reflux disease, unspecified whether esophagitis present     K21.9    Relevant Orders    Request for Pre-Admission Testing Visit            We spent 45 minutes reviewing the three surgical options available in the treatment of morbid obesity in our practice. We reviewed the laparoscopic approach to the Jermaine-en-Y gastric bypass, the vertical sleeve gastrectomy, and the adjustable gastric band. We reviewed the surgical technique, the risks, and my complication rates. We also reviewed the expected weight loss, the rules necessary for nacho-term success, the nutritional supplementation recommended for each operation, and the importance of incorporating excercise into the lifestyle to maintain the weight. We reviewed both the national history with each operation, my experience with each operation, the lack of long-term weight loss data with the vertical sleeve gastrectomy and the potential for exacerbating reflux with the vertical sleeve gastrectomy. In addition, we discussed the risk of adhesions, internal hernias, iron and calcium deficiency, dumping syndrome and potential for gastrojejunal ulcer with the RYGB.  Lala has daily reflux.  Based on this we discussed proceeding with RYGB as long as Lala quits smoking and understands that smoking is incompatible with weight loss surgery due to lifetime risk of developing a gastrojejunal ulcer.  She will need a sleep study prior to surgery as she is at risk for having undiagnosed ARSALAN based on symptoms and BMI of 51.  We will obtain EGD prior to surgery to assess her GERD symptoms.         En Levine MD

## 2024-07-11 ENCOUNTER — APPOINTMENT (OUTPATIENT)
Dept: SURGERY | Facility: CLINIC | Age: 34
End: 2024-07-11
Payer: COMMERCIAL

## 2024-07-11 ENCOUNTER — OFFICE VISIT (OUTPATIENT)
Dept: OBSTETRICS AND GYNECOLOGY | Facility: CLINIC | Age: 34
End: 2024-07-11
Payer: COMMERCIAL

## 2024-07-11 VITALS — HEIGHT: 66 IN | DIASTOLIC BLOOD PRESSURE: 76 MMHG | BODY MASS INDEX: 51.49 KG/M2 | SYSTOLIC BLOOD PRESSURE: 124 MMHG

## 2024-07-11 DIAGNOSIS — N89.8 VAGINAL ITCHING: Primary | ICD-10-CM

## 2024-07-11 PROCEDURE — 87205 SMEAR GRAM STAIN: CPT

## 2024-07-11 PROCEDURE — 3008F BODY MASS INDEX DOCD: CPT | Performed by: NURSE PRACTITIONER

## 2024-07-11 PROCEDURE — 99213 OFFICE O/P EST LOW 20 MIN: CPT | Performed by: NURSE PRACTITIONER

## 2024-07-11 PROCEDURE — 3074F SYST BP LT 130 MM HG: CPT | Performed by: NURSE PRACTITIONER

## 2024-07-11 PROCEDURE — 4004F PT TOBACCO SCREEN RCVD TLK: CPT | Performed by: NURSE PRACTITIONER

## 2024-07-11 PROCEDURE — 3078F DIAST BP <80 MM HG: CPT | Performed by: NURSE PRACTITIONER

## 2024-07-11 RX ORDER — FLUCONAZOLE 150 MG/1
150 TABLET ORAL ONCE
Qty: 1 TABLET | Refills: 1 | Status: SHIPPED | OUTPATIENT
Start: 2024-07-11 | End: 2024-07-11

## 2024-07-11 RX ORDER — NYSTATIN AND TRIAMCINOLONE ACETONIDE 100000; 1 [USP'U]/G; MG/G
OINTMENT TOPICAL 2 TIMES DAILY
Qty: 30 G | Refills: 0 | Status: SHIPPED | OUTPATIENT
Start: 2024-07-11

## 2024-07-11 ASSESSMENT — ENCOUNTER SYMPTOMS
FEVER: 0
DYSURIA: 0
DIFFICULTY URINATING: 0
CHILLS: 0
FREQUENCY: 0

## 2024-07-11 NOTE — PROGRESS NOTES
"Chief Complaint    Vaginal Itching        HPI    PATIENT IS HERE FOR VAGINAL ITCHING, STATES THAT SHE FELT UNCOMFORTABLE IN THE VAGINAL AREA ALONG WITH REDNESS AND PAIN.   PATIENT STATES THIS HAS BEEN GOING ON FOR A WEEK.    Last edited by Therese Ji MA on 2024  9:42 AM.         34 y.o.  female who presents with complaints of vaginal symptoms.   Symptoms began 1 week ago.   Reports symptoms of external vaginal itching, irritation, redness.  Denies symptoms of abnormal vaginal discharge, odor.  Denies fever, chills, urinary symptoms, pelvic pain.    She is sexually active with . Denies dyspareunia.   H/O tubal ligation.   PMH: HTN, obesity - BMI 51.49.     /76   Ht 1.676 m (5' 6\")   LMP 2024 (Exact Date)   BMI 51.49 kg/m²      Review of Systems   Constitutional:  Negative for chills and fever.   Genitourinary:  Positive for genital sores. Negative for decreased urine volume, difficulty urinating, dysuria, frequency, pelvic pain, urgency, vaginal bleeding, vaginal discharge and vaginal pain.        Physical Exam  Constitutional:       Appearance: Normal appearance.   HENT:      Head: Normocephalic.      Nose: Nose normal.   Pulmonary:      Effort: Pulmonary effort is normal.   Genitourinary:     Vagina: Normal.      Cervix: Normal.      Rectum: Normal.      Comments: Bilateral labial majora erythema as well as in skin folds of groin where skin is dry, flaking  Musculoskeletal:         General: Normal range of motion.      Cervical back: Normal range of motion and neck supple.   Skin:     General: Skin is warm and dry.   Neurological:      Mental Status: She is alert.   Psychiatric:         Mood and Affect: Mood normal.         Behavior: Behavior normal.          Assessment/Plan:  1. Vaginal itching  -Collected: Vaginitis Gram Stain For Bacterial Vaginosis + Yeast  -Will notify of results.  -Vulvar skin care reviewed - no scented soaps or detergents, avoid dryer sheets, wear " loose-fitting, cotton clothing, keep area dry.  -May apply barrier ointment such as Vaseline.   -Rx: fluconazole (Diflucan) 150 mg tablet; Take 1 tablet (150 mg) by mouth 1 time for 1 dose.  Dispense: 1 tablet; Refill:   -Rx: nystatin-triamcinolone (Mycolog II) ointment; Apply topically 2 times a day.  Dispense: 30 g; Refill: 0   -Follow up as needed.

## 2024-07-12 LAB
CLUE CELLS VAG LPF-#/AREA: ABNORMAL /[LPF]
NUGENT SCORE: 1
YEAST VAG WET PREP-#/AREA: PRESENT

## 2024-07-15 ENCOUNTER — TELEPHONE (OUTPATIENT)
Dept: PRIMARY CARE | Facility: CLINIC | Age: 34
End: 2024-07-15
Payer: COMMERCIAL

## 2024-07-15 DIAGNOSIS — U07.1 COVID-19: Primary | ICD-10-CM

## 2024-07-15 DIAGNOSIS — N89.8 VAGINAL ITCHING: ICD-10-CM

## 2024-07-15 DIAGNOSIS — B37.31 VAGINAL YEAST INFECTION: Primary | ICD-10-CM

## 2024-07-15 RX ORDER — DEXAMETHASONE 4 MG/1
4 TABLET ORAL
Qty: 5 TABLET | Refills: 0 | Status: SHIPPED | OUTPATIENT
Start: 2024-07-15 | End: 2024-07-20

## 2024-07-15 RX ORDER — FLUTICASONE PROPIONATE 50 MCG
1 SPRAY, SUSPENSION (ML) NASAL DAILY
Qty: 16 G | Refills: 0 | Status: SHIPPED | OUTPATIENT
Start: 2024-07-15 | End: 2024-07-17

## 2024-07-15 RX ORDER — GUAIFENESIN 600 MG/1
600 TABLET, EXTENDED RELEASE ORAL 2 TIMES DAILY
Qty: 10 TABLET | Refills: 0 | Status: SHIPPED | OUTPATIENT
Start: 2024-07-15 | End: 2024-07-20

## 2024-07-15 RX ORDER — FLUCONAZOLE 150 MG/1
150 TABLET ORAL
Qty: 2 TABLET | Refills: 0 | Status: SHIPPED | OUTPATIENT
Start: 2024-07-15

## 2024-07-15 RX ORDER — AZITHROMYCIN 500 MG/1
500 TABLET, FILM COATED ORAL DAILY
Qty: 5 TABLET | Refills: 0 | Status: SHIPPED | OUTPATIENT
Start: 2024-07-15 | End: 2024-07-17 | Stop reason: SDUPTHER

## 2024-07-15 NOTE — TELEPHONE ENCOUNTER
C/o bilateral ear pain, runny nose, chills/sweats, loss of taste, weak, fatigue  Covid test + today     Allergy: lisinopril     Req medication?

## 2024-07-16 DIAGNOSIS — I10 ESSENTIAL (PRIMARY) HYPERTENSION: ICD-10-CM

## 2024-07-16 RX ORDER — AMLODIPINE BESYLATE 10 MG/1
10 TABLET ORAL DAILY
Qty: 90 TABLET | Refills: 0 | Status: SHIPPED | OUTPATIENT
Start: 2024-07-16

## 2024-07-17 ENCOUNTER — APPOINTMENT (OUTPATIENT)
Dept: SURGERY | Facility: CLINIC | Age: 34
End: 2024-07-17
Payer: COMMERCIAL

## 2024-07-17 DIAGNOSIS — U07.1 COVID-19: ICD-10-CM

## 2024-07-17 DIAGNOSIS — I10 UNCONTROLLED HYPERTENSION: ICD-10-CM

## 2024-07-17 DIAGNOSIS — G47.00 INSOMNIA, UNSPECIFIED TYPE: ICD-10-CM

## 2024-07-17 DIAGNOSIS — R60.9 EDEMA, UNSPECIFIED TYPE: ICD-10-CM

## 2024-07-17 RX ORDER — FLUTICASONE PROPIONATE 50 MCG
1 SPRAY, SUSPENSION (ML) NASAL DAILY
Qty: 16 ML | Refills: 0 | Status: SHIPPED | OUTPATIENT
Start: 2024-07-17 | End: 2025-07-17

## 2024-07-17 RX ORDER — LOSARTAN POTASSIUM 50 MG/1
50 TABLET ORAL DAILY
Qty: 90 TABLET | Refills: 0 | Status: SHIPPED | OUTPATIENT
Start: 2024-07-17

## 2024-07-17 RX ORDER — AZITHROMYCIN 500 MG/1
500 TABLET, FILM COATED ORAL DAILY
Qty: 2 TABLET | Refills: 0 | Status: SHIPPED | OUTPATIENT
Start: 2024-07-17 | End: 2024-07-19

## 2024-07-17 RX ORDER — TRAZODONE HYDROCHLORIDE 50 MG/1
50 TABLET ORAL NIGHTLY PRN
Qty: 90 TABLET | Refills: 0 | Status: SHIPPED | OUTPATIENT
Start: 2024-07-17

## 2024-07-17 RX ORDER — FUROSEMIDE 20 MG/1
20 TABLET ORAL DAILY
Qty: 90 TABLET | Refills: 0 | Status: SHIPPED | OUTPATIENT
Start: 2024-07-17

## 2024-07-19 ENCOUNTER — TELEPHONE (OUTPATIENT)
Dept: PRIMARY CARE | Facility: CLINIC | Age: 34
End: 2024-07-19
Payer: COMMERCIAL

## 2024-07-19 DIAGNOSIS — H66.90 EAR INFECTION: Primary | ICD-10-CM

## 2024-07-19 RX ORDER — AMOXICILLIN 500 MG/1
500 CAPSULE ORAL EVERY 8 HOURS SCHEDULED
Qty: 30 CAPSULE | Refills: 0 | Status: SHIPPED | OUTPATIENT
Start: 2024-07-19 | End: 2024-07-29

## 2024-08-02 RX ORDER — ONDANSETRON HYDROCHLORIDE 2 MG/ML
4 INJECTION, SOLUTION INTRAVENOUS ONCE AS NEEDED
OUTPATIENT
Start: 2024-08-02

## 2024-08-02 RX ORDER — NALOXONE HYDROCHLORIDE 0.4 MG/ML
0.2 INJECTION, SOLUTION INTRAMUSCULAR; INTRAVENOUS; SUBCUTANEOUS EVERY 5 MIN PRN
OUTPATIENT
Start: 2024-08-02

## 2024-08-02 RX ORDER — SODIUM CHLORIDE, SODIUM LACTATE, POTASSIUM CHLORIDE, CALCIUM CHLORIDE 600; 310; 30; 20 MG/100ML; MG/100ML; MG/100ML; MG/100ML
20 INJECTION, SOLUTION INTRAVENOUS CONTINUOUS
OUTPATIENT
Start: 2024-08-02

## 2024-08-02 RX ORDER — FLUMAZENIL 0.1 MG/ML
0.2 INJECTION INTRAVENOUS ONCE AS NEEDED
OUTPATIENT
Start: 2024-08-02

## 2024-08-07 ENCOUNTER — APPOINTMENT (OUTPATIENT)
Dept: SURGERY | Facility: CLINIC | Age: 34
End: 2024-08-07
Payer: COMMERCIAL

## 2024-08-08 ENCOUNTER — APPOINTMENT (OUTPATIENT)
Dept: PREADMISSION TESTING | Facility: HOSPITAL | Age: 34
End: 2024-08-08
Payer: COMMERCIAL

## 2024-08-13 DIAGNOSIS — F17.200 SMOKER: ICD-10-CM

## 2024-08-13 DIAGNOSIS — Z71.6 ENCOUNTER FOR SMOKING CESSATION COUNSELING: ICD-10-CM

## 2024-08-13 RX ORDER — VARENICLINE TARTRATE 1 MG/1
1 TABLET, FILM COATED ORAL 2 TIMES DAILY
Qty: 60 TABLET | Refills: 2 | Status: SHIPPED | OUTPATIENT
Start: 2024-08-13 | End: 2024-11-11

## 2024-08-15 ENCOUNTER — APPOINTMENT (OUTPATIENT)
Dept: GASTROENTEROLOGY | Facility: HOSPITAL | Age: 34
End: 2024-08-15
Payer: COMMERCIAL

## 2024-08-15 DIAGNOSIS — K21.9 GASTRO-ESOPHAGEAL REFLUX DISEASE WITHOUT ESOPHAGITIS: Primary | ICD-10-CM

## 2024-09-13 DIAGNOSIS — U07.1 COVID-19: ICD-10-CM

## 2024-09-13 RX ORDER — FLUTICASONE PROPIONATE 50 MCG
1 SPRAY, SUSPENSION (ML) NASAL DAILY
Qty: 48 ML | Refills: 1 | Status: SHIPPED | OUTPATIENT
Start: 2024-09-13 | End: 2025-09-13

## 2024-09-18 ENCOUNTER — TELEPHONE (OUTPATIENT)
Dept: PRIMARY CARE | Facility: CLINIC | Age: 34
End: 2024-09-18
Payer: COMMERCIAL

## 2024-09-18 DIAGNOSIS — G47.00 INSOMNIA, UNSPECIFIED TYPE: Primary | ICD-10-CM

## 2024-09-18 RX ORDER — TRAZODONE HYDROCHLORIDE 100 MG/1
100 TABLET ORAL NIGHTLY PRN
Qty: 30 TABLET | Refills: 0 | Status: SHIPPED | OUTPATIENT
Start: 2024-09-18 | End: 2025-09-18

## 2024-09-18 NOTE — TELEPHONE ENCOUNTER
Pt prescribed trazodone 50mg at bedtime, taking hours to fall asleep took 1/2 pill more about an hour later then eventually the other half   Can you change to medication stronger?

## 2024-10-10 DIAGNOSIS — G47.00 INSOMNIA, UNSPECIFIED TYPE: ICD-10-CM

## 2024-10-11 RX ORDER — TRAZODONE HYDROCHLORIDE 100 MG/1
100 TABLET ORAL NIGHTLY PRN
Qty: 90 TABLET | Refills: 0 | Status: SHIPPED | OUTPATIENT
Start: 2024-10-11 | End: 2025-10-11

## 2024-10-13 DIAGNOSIS — I10 ESSENTIAL (PRIMARY) HYPERTENSION: ICD-10-CM

## 2024-10-14 RX ORDER — AMLODIPINE BESYLATE 10 MG/1
10 TABLET ORAL DAILY
Qty: 90 TABLET | Refills: 0 | Status: SHIPPED | OUTPATIENT
Start: 2024-10-14

## 2024-10-22 ENCOUNTER — APPOINTMENT (OUTPATIENT)
Dept: OBSTETRICS AND GYNECOLOGY | Facility: CLINIC | Age: 34
End: 2024-10-22
Payer: COMMERCIAL

## 2024-10-22 VITALS
SYSTOLIC BLOOD PRESSURE: 118 MMHG | BODY MASS INDEX: 47.09 KG/M2 | HEIGHT: 66 IN | DIASTOLIC BLOOD PRESSURE: 76 MMHG | WEIGHT: 293 LBS

## 2024-10-22 DIAGNOSIS — L68.0 HIRSUTISM: ICD-10-CM

## 2024-10-22 DIAGNOSIS — Z12.4 CERVICAL CANCER SCREENING: ICD-10-CM

## 2024-10-22 DIAGNOSIS — Z01.419 WELL WOMAN EXAM WITH ROUTINE GYNECOLOGICAL EXAM: Primary | ICD-10-CM

## 2024-10-22 PROCEDURE — 3074F SYST BP LT 130 MM HG: CPT | Performed by: NURSE PRACTITIONER

## 2024-10-22 PROCEDURE — 99395 PREV VISIT EST AGE 18-39: CPT | Performed by: NURSE PRACTITIONER

## 2024-10-22 PROCEDURE — 88175 CYTOPATH C/V AUTO FLUID REDO: CPT

## 2024-10-22 PROCEDURE — 3078F DIAST BP <80 MM HG: CPT | Performed by: NURSE PRACTITIONER

## 2024-10-22 PROCEDURE — 3008F BODY MASS INDEX DOCD: CPT | Performed by: NURSE PRACTITIONER

## 2024-10-22 PROCEDURE — 4004F PT TOBACCO SCREEN RCVD TLK: CPT | Performed by: NURSE PRACTITIONER

## 2024-10-22 PROCEDURE — 87624 HPV HI-RISK TYP POOLED RSLT: CPT

## 2024-10-22 ASSESSMENT — ENCOUNTER SYMPTOMS
RESPIRATORY NEGATIVE: 1
ENDOCRINE NEGATIVE: 1
NEUROLOGICAL NEGATIVE: 1
ALLERGIC/IMMUNOLOGIC NEGATIVE: 1
GASTROINTESTINAL NEGATIVE: 1
EYES NEGATIVE: 1
MUSCULOSKELETAL NEGATIVE: 1
PSYCHIATRIC NEGATIVE: 1
CARDIOVASCULAR NEGATIVE: 1
HEMATOLOGIC/LYMPHATIC NEGATIVE: 1
CONSTITUTIONAL NEGATIVE: 1

## 2024-10-22 NOTE — PROGRESS NOTES
"Chief Complaint    Annual Exam        HPI    ANNUAL    DECLINE CHAPERONE    PAP 3/16/2021 NEG / NEG   MAMMO NEVER  DEXA NEVER  COLON PER PATIENT YEARS AGO   Last edited by Therese Ji MA on 10/22/2024  2:43 PM.         34 y.o.  female presents for annual well woman exam.   Patient's menstrual cycles since have been regular every 26 days, lasting 6-7 days. Periods heavy, painful but manageable at this time. Denies abnormal bleeding in between periods.   She is sexually active with . Denies dyspareunia.   H/O tubal ligation.   She denies any breast concerns.   H/O abnormal paps in past. LSIL and colposcopy in 2016 showed NORM-1. Paps normal since. Last pap: 2021 negative and negative HPV.   Denies pelvic pain.  Denies abnormal vaginal symptoms.  Denies urinary or bowel concerns.   She is smoker. Counseled cessation.   PMH: HTN. Reports concerns of abnormal facial hair growth.   Family h/o breast cancer: None  Family h/o GYN cancer: None  Family h/o colon cancer: None  Works for [employer], floats to different hospitals. Kids playing sports, softball and volleyball.     /76   Ht 1.676 m (5' 6\")   Wt 141 kg (309 lb 12.8 oz)   LMP 2024 (Exact Date)   BMI 50.00 kg/m²      Current Outpatient Medications   Medication Instructions    amLODIPine (NORVASC) 10 mg, oral, Daily    dexAMETHasone (DECADRON) 4 mg, oral, Daily with breakfast    fluconazole (DIFLUCAN) 150 mg, oral, Every 3 days, As needed for yeast infection    fluticasone (Flonase) 50 mcg/actuation nasal spray 1 spray, Each Nostril, Daily, Shake gently. Before first use, prime pump. After use, clean tip and replace cap.    furosemide (LASIX) 20 mg, oral, Daily    losartan (COZAAR) 50 mg, oral, Daily    nicotine (Nicoderm CQ) 21 mg/24 hr patch 1 patch, transdermal, Every 24 hours    nystatin-triamcinolone (Mycolog II) ointment Topical, 2 times daily    omeprazole (PRILOSEC) 20 mg, Daily before breakfast    traZODone (DESYREL) 100 mg, oral, " Nightly PRN    varenicline (CHANTIX) 1 mg, oral, 2 times daily, Take with full glass of water.        Review of Systems   Constitutional: Negative.    HENT: Negative.     Eyes: Negative.    Respiratory: Negative.     Cardiovascular: Negative.    Gastrointestinal: Negative.    Endocrine: Negative.    Genitourinary: Negative.    Musculoskeletal: Negative.    Skin: Negative.    Allergic/Immunologic: Negative.    Neurological: Negative.    Hematological: Negative.    Psychiatric/Behavioral: Negative.     All other systems reviewed and are negative.       Physical Exam  Constitutional:       Appearance: Normal appearance.   HENT:      Head: Normocephalic.      Nose: Nose normal.   Cardiovascular:      Rate and Rhythm: Normal rate and regular rhythm.   Pulmonary:      Effort: Pulmonary effort is normal.      Breath sounds: Normal breath sounds.   Chest:   Breasts:     Right: Normal.      Left: Normal.   Abdominal:      General: Abdomen is flat.      Palpations: Abdomen is soft.   Genitourinary:     General: Normal vulva.      Vagina: Normal.      Cervix: Normal.      Uterus: Normal.       Adnexa: Right adnexa normal and left adnexa normal.      Rectum: Normal.      Comments: Pap collected  Bimanual limited d/t habitus  Musculoskeletal:         General: Normal range of motion.      Cervical back: Normal range of motion and neck supple.   Skin:     General: Skin is warm and dry.   Neurological:      Mental Status: She is alert.   Psychiatric:         Mood and Affect: Mood normal.         Behavior: Behavior normal.          Assessment/Plan:   1. Well woman exam with routine gynecological exam (Primary)  -Pap test w/HPV testing collected today.   -Self breast awareness exams reviewed.  -Advised yearly well woman exams.   -Follow up sooner if needed.     2. Cervical cancer screening  - THINPREP PAP    3. Hirsutism  -Testosterone,Free and Total; Future  -Will notify of results.   -Referral to Dermatology

## 2024-11-05 ENCOUNTER — APPOINTMENT (OUTPATIENT)
Dept: BEHAVIORAL HEALTH | Facility: CLINIC | Age: 34
End: 2024-11-05
Payer: COMMERCIAL

## 2024-11-17 DIAGNOSIS — I10 UNCONTROLLED HYPERTENSION: ICD-10-CM

## 2024-11-18 RX ORDER — LOSARTAN POTASSIUM 50 MG/1
50 TABLET ORAL DAILY
Qty: 90 TABLET | Refills: 0 | Status: SHIPPED | OUTPATIENT
Start: 2024-11-18

## 2025-01-06 DIAGNOSIS — G47.00 INSOMNIA, UNSPECIFIED TYPE: ICD-10-CM

## 2025-01-06 RX ORDER — TRAZODONE HYDROCHLORIDE 100 MG/1
100 TABLET ORAL NIGHTLY PRN
Qty: 90 TABLET | Refills: 0 | Status: SHIPPED | OUTPATIENT
Start: 2025-01-06 | End: 2026-01-06

## 2025-01-09 DIAGNOSIS — I10 ESSENTIAL (PRIMARY) HYPERTENSION: ICD-10-CM

## 2025-01-10 RX ORDER — AMLODIPINE BESYLATE 10 MG/1
10 TABLET ORAL DAILY
Qty: 90 TABLET | Refills: 0 | Status: SHIPPED | OUTPATIENT
Start: 2025-01-10

## 2025-01-28 DIAGNOSIS — I10 UNCONTROLLED HYPERTENSION: ICD-10-CM

## 2025-01-28 RX ORDER — LOSARTAN POTASSIUM 50 MG/1
50 TABLET ORAL DAILY
Qty: 90 TABLET | Refills: 0 | Status: SHIPPED | OUTPATIENT
Start: 2025-01-28

## 2025-04-02 ENCOUNTER — APPOINTMENT (OUTPATIENT)
Dept: DERMATOLOGY | Facility: CLINIC | Age: 35
End: 2025-04-02
Payer: COMMERCIAL

## 2025-04-02 ASSESSMENT — DERMATOLOGY QUALITY OF LIFE (QOL) ASSESSMENT
WHAT SINGLE SKIN CONDITION LISTED BELOW IS THE PATIENT ANSWERING THE QUALITY-OF-LIFE ASSESSMENT QUESTIONS ABOUT: ROSACEA
RATE HOW BOTHERED YOU ARE BY SYMPTOMS OF YOUR SKIN PROBLEM (EG, ITCHING, STINGING BURNING, HURTING OR SKIN IRRITATION): 4
RATE HOW EMOTIONALLY BOTHERED YOU ARE BY YOUR SKIN PROBLEM (FOR EXAMPLE, WORRY, EMBARRASSMENT, FRUSTRATION): 6 - ALWAYS BOTHERED
DATE THE QUALITY-OF-LIFE ASSESSMENT WAS COMPLETED: 63553
RATE HOW EMOTIONALLY BOTHERED YOU ARE BY YOUR SKIN PROBLEM (FOR EXAMPLE, WORRY, EMBARRASSMENT, FRUSTRATION): 6 - ALWAYS BOTHERED
RATE HOW BOTHERED YOU ARE BY SYMPTOMS OF YOUR SKIN PROBLEM (EG, ITCHING, STINGING BURNING, HURTING OR SKIN IRRITATION): 4
DATE THE QUALITY-OF-LIFE ASSESSMENT WAS COMPLETED: 63553
WHAT SINGLE SKIN CONDITION LISTED BELOW IS THE PATIENT ANSWERING THE QUALITY-OF-LIFE ASSESSMENT QUESTIONS ABOUT: ROSACEA
RATE HOW BOTHERED YOU ARE BY EFFECTS OF YOUR SKIN PROBLEMS ON YOUR ACTIVITIES (EG, GOING OUT, ACCOMPLISHING WHAT YOU WANT, WORK ACTIVITIES OR YOUR RELATIONSHIPS WITH OTHERS): 1
RATE HOW EMOTIONALLY BOTHERED YOU ARE BY YOUR SKIN PROBLEM (FOR EXAMPLE, WORRY, EMBARRASSMENT, FRUSTRATION): 6 - ALWAYS BOTHERED
RATE HOW BOTHERED YOU ARE BY EFFECTS OF YOUR SKIN PROBLEMS ON YOUR ACTIVITIES (EG, GOING OUT, ACCOMPLISHING WHAT YOU WANT, WORK ACTIVITIES OR YOUR RELATIONSHIPS WITH OTHERS): 1
RATE HOW EMOTIONALLY BOTHERED YOU ARE BY YOUR SKIN PROBLEM (FOR EXAMPLE, WORRY, EMBARRASSMENT, FRUSTRATION): 6 - ALWAYS BOTHERED
RATE HOW BOTHERED YOU ARE BY SYMPTOMS OF YOUR SKIN PROBLEM (EG, ITCHING, STINGING BURNING, HURTING OR SKIN IRRITATION): 4
WHAT SINGLE SKIN CONDITION LISTED BELOW IS THE PATIENT ANSWERING THE QUALITY-OF-LIFE ASSESSMENT QUESTIONS ABOUT: ROSACEA
WHAT SINGLE SKIN CONDITION LISTED BELOW IS THE PATIENT ANSWERING THE QUALITY-OF-LIFE ASSESSMENT QUESTIONS ABOUT: ROSACEA
RATE HOW BOTHERED YOU ARE BY EFFECTS OF YOUR SKIN PROBLEMS ON YOUR ACTIVITIES (EG, GOING OUT, ACCOMPLISHING WHAT YOU WANT, WORK ACTIVITIES OR YOUR RELATIONSHIPS WITH OTHERS): 1
RATE HOW BOTHERED YOU ARE BY SYMPTOMS OF YOUR SKIN PROBLEM (EG, ITCHING, STINGING BURNING, HURTING OR SKIN IRRITATION): 4
RATE HOW BOTHERED YOU ARE BY EFFECTS OF YOUR SKIN PROBLEMS ON YOUR ACTIVITIES (EG, GOING OUT, ACCOMPLISHING WHAT YOU WANT, WORK ACTIVITIES OR YOUR RELATIONSHIPS WITH OTHERS): 1

## 2025-04-05 ENCOUNTER — OFFICE VISIT (OUTPATIENT)
Dept: URGENT CARE | Age: 35
End: 2025-04-05
Payer: COMMERCIAL

## 2025-04-05 ENCOUNTER — ANCILLARY PROCEDURE (OUTPATIENT)
Dept: URGENT CARE | Age: 35
End: 2025-04-05
Payer: COMMERCIAL

## 2025-04-05 VITALS
BODY MASS INDEX: 49.87 KG/M2 | TEMPERATURE: 97.5 F | WEIGHT: 293 LBS | DIASTOLIC BLOOD PRESSURE: 86 MMHG | HEART RATE: 95 BPM | RESPIRATION RATE: 16 BRPM | SYSTOLIC BLOOD PRESSURE: 130 MMHG | OXYGEN SATURATION: 98 %

## 2025-04-05 DIAGNOSIS — M25.512 ACUTE PAIN OF LEFT SHOULDER: ICD-10-CM

## 2025-04-05 DIAGNOSIS — M25.512 ACUTE PAIN OF LEFT SHOULDER: Primary | ICD-10-CM

## 2025-04-05 PROCEDURE — 73030 X-RAY EXAM OF SHOULDER: CPT | Mod: LEFT SIDE

## 2025-04-05 RX ORDER — METHYLPREDNISOLONE 4 MG/1
TABLET ORAL
Qty: 21 TABLET | Refills: 0 | Status: SHIPPED | OUTPATIENT
Start: 2025-04-05 | End: 2025-04-12

## 2025-04-05 ASSESSMENT — ENCOUNTER SYMPTOMS: ARTHRALGIAS: 1

## 2025-04-05 NOTE — PROGRESS NOTES
Subjective   Patient ID: Lala Smart is a 35 y.o. female. They present today with a chief complaint of Shoulder Pain (Started a month ago, left shoulder pain, says it has been worsening, is having trouble moving her arm, no reported injury.).    History of Present Illness  HPI    Past Medical History  Allergies as of 04/05/2025 - Reviewed 04/05/2025   Allergen Reaction Noted    Lisinopril Cough 03/01/2023       (Not in a hospital admission)       Past Medical History:   Diagnosis Date    Acute bronchitis due to other specified organisms 03/30/2020    Acute viral bronchitis    Acute sinusitis 01/18/2024    Acute URI 01/18/2024    Anxiety     Candidiasis, unspecified 06/13/2016    Yeast infection    Congestion of nasal sinus 01/18/2024    Encounter for pregnancy test, result negative     Pregnancy examination or test, negative result    Encounter for pregnancy test, result unknown     Pregnancy examination or test, pregnancy unconfirmed    Essential (primary) hypertension 11/27/2018    White coat syndrome with hypertension    Fatigue 02/19/2024    GERD (gastroesophageal reflux disease) 2015    Hirsutism 06/07/2016    Hirsutism    History of varicella 02/19/2024    HTN (hypertension)     Injury of wrist 02/19/2024    Insomnia 2006    Insomnia     Low grade squamous intraepithelial lesion on cytologic smear of vagina (LGSIL) 06/02/2016    LGSIL Pap smear of vagina    Near syncope 01/18/2024    Pain in wrist 02/19/2024    Palpitations 01/18/2024    Papillomavirus as the cause of diseases classified elsewhere 06/02/2016    HPV in female    Personal history of other diseases of the female genital tract 05/02/2016    History of ovarian cyst    Personal history of other endocrine, nutritional and metabolic disease     History of hypercholesterolemia    Personal history of other endocrine, nutritional and metabolic disease 05/06/2015    History of obesity    Personal history of other infectious and parasitic diseases      History of chicken pox    Personal history of other specified conditions 2020    History of fatigue    Personal history of other specified conditions     History of abnormal Pap smear    Snoring     Tubal ectopic pregnancy ([facility]) 2024    Unspecified tubal pregnancy without intrauterine pregnancy ([facility])     Ectopic pregnancy, tubal       Past Surgical History:   Procedure Laterality Date     SECTION, CLASSIC  2016     Section    COLONOSCOPY  2016    Colonoscopy (Fiberoptic)    DILATION AND CURETTAGE OF UTERUS  2016    Dilation And Curettage    LAPAROSCOPY DIAGNOSTIC / BIOPSY / ASPIRATION / LYSIS  2016    Exploratory Laparoscopy    OTHER SURGICAL HISTORY  2016    Colposcopy Cervix With Biopsy(S)    OTHER SURGICAL HISTORY  2016    Laparoscopy With Excision Of Ectopic Pregnancy    TONSILLECTOMY Bilateral     circa 2018    TUBAL LIGATION  2016    Tubal Ligation        reports that she has been smoking cigarettes. She started smoking about 21 years ago. She has a 21.3 pack-year smoking history. She has never used smokeless tobacco. She reports current alcohol use. She reports that she does not use drugs.    Review of Systems  Review of Systems   Musculoskeletal:  Positive for arthralgias.   All other systems reviewed and are negative.                                 Objective    Vitals:    25 1536   BP: 130/86   BP Location: Left arm   Patient Position: Sitting   BP Cuff Size: Adult long   Pulse: 95   Resp: 16   Temp: 36.4 °C (97.5 °F)   TempSrc: Temporal   SpO2: 98%   Weight: 140 kg (309 lb)     No LMP recorded.    Physical Exam  Vitals reviewed.   Constitutional:       Appearance: Normal appearance.   HENT:      Head: Normocephalic and atraumatic.   Cardiovascular:      Rate and Rhythm: Normal rate and regular rhythm.      Pulses: Normal pulses.      Heart sounds: Normal heart sounds.   Pulmonary:      Effort: Pulmonary effort is  normal.      Breath sounds: Normal breath sounds.   Musculoskeletal:         General: Tenderness present.      Cervical back: Normal range of motion.   Skin:     General: Skin is warm.      Capillary Refill: Capillary refill takes less than 2 seconds.   Neurological:      General: No focal deficit present.      Mental Status: She is alert and oriented to person, place, and time.   Psychiatric:         Mood and Affect: Mood normal.         Behavior: Behavior normal.         Procedures    Point of Care Test & Imaging Results from this visit  No results found for this visit on 04/05/25.   Imaging  No results found.    Cardiology, Vascular, and Other Imaging  No other imaging results found for the past 2 days      Diagnostic study results (if any) were reviewed by JO Acosta.    Assessment/Plan   Allergies, medications, history, and pertinent labs/EKGs/Imaging reviewed by JO Acosta.     Medical Decision Making  35-year-old female presents for left shoulder pain she reports for a month her shoulder has been sore and she does repetitive movement with her arm on exam patient is in no acute distress vital signs are stable.  She denies numbness and tingling she reports it is difficult for her to raise her arm on exam there is no obvious deformity no edema no erythema no ecchymosis she denies injury we will x-ray left shoulder results pending.  X-ray read by radiology of the left shoulder shows no fracture no dislocation no degenerative changes no lytic or sclerotic lesion there is no soft tissue abnormality seen patient is to start Medrol Dosepak as directed and follow-up with orthopedics on Monday patient is go to emergency department any worsening symptoms patient agrees with plan of care patient left in stable condition    Orders and Diagnoses  There are no diagnoses linked to this encounter.    Medical Admin Record      Patient disposition: Home    Electronically signed by  Charlene Bird, APRN-CNP  3:41 PM

## 2025-04-05 NOTE — PATIENT INSTRUCTIONS
Start Medrol dose pack as directed, follow up with orthopedics on Monday, go to emergency department with worsening symptoms.  I will call you with Xray results.

## 2025-04-07 ENCOUNTER — OFFICE VISIT (OUTPATIENT)
Dept: ORTHOPEDIC SURGERY | Facility: CLINIC | Age: 35
End: 2025-04-07
Payer: COMMERCIAL

## 2025-04-07 DIAGNOSIS — M25.512 ACUTE PAIN OF LEFT SHOULDER: ICD-10-CM

## 2025-04-07 DIAGNOSIS — M75.82 ROTATOR CUFF TENDONITIS, LEFT: Primary | ICD-10-CM

## 2025-04-07 PROCEDURE — 20610 DRAIN/INJ JOINT/BURSA W/O US: CPT

## 2025-04-07 PROCEDURE — 99203 OFFICE O/P NEW LOW 30 MIN: CPT

## 2025-04-07 RX ORDER — LIDOCAINE HYDROCHLORIDE 5 MG/ML
4 INJECTION, SOLUTION INFILTRATION; PERINEURAL
Status: COMPLETED | OUTPATIENT
Start: 2025-04-07 | End: 2025-04-07

## 2025-04-07 RX ORDER — NAPROXEN 500 MG/1
500 TABLET ORAL 2 TIMES DAILY
Qty: 60 TABLET | Refills: 0 | Status: SHIPPED | OUTPATIENT
Start: 2025-04-07 | End: 2025-05-07

## 2025-04-07 RX ORDER — TRIAMCINOLONE ACETONIDE 40 MG/ML
40 INJECTION, SUSPENSION INTRA-ARTICULAR; INTRAMUSCULAR
Status: COMPLETED | OUTPATIENT
Start: 2025-04-07 | End: 2025-04-07

## 2025-04-07 RX ADMIN — LIDOCAINE HYDROCHLORIDE 4 ML: 5 INJECTION, SOLUTION INFILTRATION; PERINEURAL at 10:56

## 2025-04-07 RX ADMIN — TRIAMCINOLONE ACETONIDE 40 MG: 40 INJECTION, SUSPENSION INTRA-ARTICULAR; INTRAMUSCULAR at 10:56

## 2025-04-07 ASSESSMENT — PAIN SCALES - GENERAL: PAINLEVEL_OUTOF10: 7

## 2025-04-07 ASSESSMENT — PAIN - FUNCTIONAL ASSESSMENT: PAIN_FUNCTIONAL_ASSESSMENT: 0-10

## 2025-04-07 NOTE — PROGRESS NOTES
HPI  Lala Smart is a 35 y.o. 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    .  she states pain is lateral shoulder and will radiate down lateral arm.  She has not tried any conservative treatment to this point.  She did go to urgent care and was prescribed a Medrol pack, but did not start taking yet.  Denies any injury or trauma to the shoulder    Past Medical History: HTN    Medication  Current Outpatient Medications on File Prior to Visit   Medication Sig Dispense Refill    amLODIPine (Norvasc) 10 mg tablet TAKE 1 TABLET BY MOUTH EVERY DAY 90 tablet 0    dexAMETHasone (Decadron) 4 mg tablet Take 1 tablet (4 mg) by mouth once daily with breakfast for 5 days. 5 tablet 0    fluconazole (Diflucan) 150 mg tablet Take 1 tablet (150 mg) by mouth every 3 days. As needed for yeast infection (Patient not taking: Reported on 10/22/2024) 2 tablet 0    fluticasone (Flonase) 50 mcg/actuation nasal spray ADMINISTER 1 SPRAY INTO EACH NOSTRIL ONCE DAILY. SHAKE GENTLY. BEFORE FIRST USE, PRIME PUMP. AFTER USE, CLEAN TIP AND REPLACE CAP. (Patient not taking: Reported on 10/22/2024) 48 mL 1    furosemide (Lasix) 20 mg tablet TAKE 1 TABLET BY MOUTH EVERY DAY (Patient not taking: Reported on 10/22/2024) 90 tablet 0    losartan (Cozaar) 50 mg tablet Take 1 tablet (50 mg) by mouth once daily. 90 tablet 0    methylPREDNISolone (Medrol Dospak) 4 mg tablets Take as directed on package. 21 tablet 0    nicotine (Nicoderm CQ) 21 mg/24 hr patch Place 1 patch on the skin once every 24 hours. (Patient not taking: Reported on 10/22/2024) 45 patch 0    nystatin-triamcinolone (Mycolog II) ointment Apply topically 2 times a day. 30 g 0    omeprazole (PriLOSEC) 20 mg DR capsule Take 1 capsule (20 mg) by mouth once daily in the morning.  Take before meals. Do not crush or chew.      traZODone (Desyrel) 100 mg tablet TAKE 1 TABLET (100 MG) BY MOUTH AS NEEDED AT BEDTIME FOR SLEEP. 90 tablet 0    varenicline (Chantix) 1 mg tablet TAKE 1 TABLET (1 MG) BY MOUTH 2 TIMES A DAY. TAKE WITH FULL GLASS OF WATER. 60 tablet 2     No current facility-administered medications on file prior to visit.       Physical Exam  Constitutional: well developed, well nourished female in no acute distress  Psychiatric: normal mood, appropriate affect  Eyes: sclera anicteric  HENT: normocephalic/atraumatic  CV: regular rate and rhythm   Respiratory: non labored breathing  Integumentary: no rash  Neurological: moves all extremities    Shoulder Musculoskeletal Exam    Inspection    Left      Left shoulder inspection is normal.    Palpation    Left      Crepitus: no crepitus      Increased warmth: none      Tenderness: present        Posterior shoulder: none        Clavicle: none        AC joint: none        Rotator cuff: mild        Trapezius: none        Bicipital groove: none        Proximal biceps: none        Distal biceps: none        Elbow: none    Range of Motion    Left      Active ROM: pain.       Active forward elevation: 100 (pain above 90).       Passive forward elevation: 160.       Shoulder active abduction: 120.       Passive abduction: 170.       Active external rotation at side: 80.       Internal rotation: side.     Special Tests    Left     Rotator Cuff Signs      Neer's test: positive       Ling test: negative       Drop arm test: negative     Biceps/javier Signs      Mk deformity: negative       Speed's test: negative     AC Joint Signs      Active horizontal adduction pain: negative     Instability Signs      Joint laxity: negative       Patient ID: Lala Smart is a 35 y.o. female.    L Inj/Asp: L subacromial bursa on 4/7/2025 10:56 AM  Indications: pain  Details: 22 G needle, posterior approach  Medications: 40 mg triamcinolone acetonide 40  mg/mL; 4 mL lidocaine 5 mg/mL (0.5 %)  Outcome: tolerated well, no immediate complications  Procedure, treatment alternatives, risks and benefits explained, specific risks discussed. Consent was given by the patient. Immediately prior to procedure a time out was called to verify the correct patient, procedure, equipment, support staff and site/side marked as required. Patient was prepped and draped in the usual sterile fashion.           Imaging/Lab:  X-rays were taken 4/5/25 which were reviewed by myself and read by radiology and show There is no fracture. There is no dislocation. There are no degenerative changes. There is no lytic or sclerotic lesion. There is no soft tissue abnormality seen.      Assessment  Assessment: left shoulder tendonitis    Plan  Plan:  History, physical exam, and imaging were reviewed with patient. Discussed starting with conservative treatment for her shoulder pain including RICE, antiinflammatories, injections, and PT.  She would like to start with antiinflammatories and an injection.  Left shoulder injected per procedure note above.  Advise to not take the oral steroids since we are doing the injection instead.  Will consider PT if injection doesn't completely help.  Medication: Prescription of naproxen sent to pharmacy  Follow Up: Patient to follow up as needed if pain persists or gets worse.      All questions were answered for the patient prior to end of exam and patient addressed their understanding.    Suzette Gold PA-C  04/07/25

## 2025-04-19 DIAGNOSIS — I10 ESSENTIAL (PRIMARY) HYPERTENSION: ICD-10-CM

## 2025-04-21 RX ORDER — AMLODIPINE BESYLATE 10 MG/1
10 TABLET ORAL DAILY
Qty: 90 TABLET | Refills: 0 | Status: SHIPPED | OUTPATIENT
Start: 2025-04-21

## 2025-04-22 ENCOUNTER — TELEPHONE (OUTPATIENT)
Dept: PRIMARY CARE | Facility: CLINIC | Age: 35
End: 2025-04-22
Payer: COMMERCIAL

## 2025-04-22 DIAGNOSIS — N39.0 URINARY TRACT INFECTION WITHOUT HEMATURIA, SITE UNSPECIFIED: Primary | ICD-10-CM

## 2025-04-22 RX ORDER — NITROFURANTOIN 25; 75 MG/1; MG/1
100 CAPSULE ORAL 2 TIMES DAILY
Qty: 14 CAPSULE | Refills: 0 | Status: SHIPPED | OUTPATIENT
Start: 2025-04-22 | End: 2025-04-29

## 2025-04-22 NOTE — TELEPHONE ENCOUNTER
Patient informed RX called into pharmacy for Macrobid.  
Patient took a home test for UTI and it was positive- Patient is having burning and pain during urination x 2 days. Would like RX sent to pharmacy   
Declines

## 2025-04-25 DIAGNOSIS — G47.00 INSOMNIA, UNSPECIFIED TYPE: ICD-10-CM

## 2025-04-25 RX ORDER — TRAZODONE HYDROCHLORIDE 100 MG/1
100 TABLET ORAL NIGHTLY PRN
Qty: 30 TABLET | Refills: 0 | Status: SHIPPED | OUTPATIENT
Start: 2025-04-25 | End: 2026-04-25

## 2025-04-28 ENCOUNTER — APPOINTMENT (OUTPATIENT)
Dept: DERMATOLOGY | Facility: CLINIC | Age: 35
End: 2025-04-28
Payer: COMMERCIAL

## 2025-05-01 NOTE — PROGRESS NOTES
"Patient ID:   Lala Smart is a 35 y.o. female with PMH remarkable for HTN, HLD, obesity, tobacco dependence, ADHD who presents to the office today for Annual Exam.    HEALTH MAINTENANCE: Annual Wellness Physical  Last Office Visit: 5/1/2024 for FU. Started on semaglutide 0.25mg weekly. Losartan was increased to 50mg daily. Referred to GYN in [place], Dr HERMOSILLO for further workup regarding PCOS d/t hirsutism, weight gain, dysmenorrhea and diaphoresis.   Pap smear (21-65, or hysterectomy q5yrs): 10/22/2024 -ve  Last Labs: 1/18/2024 -> DUE    319# in June 2024. Down 45# in 1 yr from our scale, 50# at home.  Reports she had some palpitations when she increased her compound semaglutide so she decreased the dose.  Has hair loss and constipation since being on the semaglutide.    Annual Physical Questions:  Pain scale: 0 (no pain)  Living will? No  POA? No  Are you currently or have you recently been threatened or abused? No  Do you feel unsafe going back to the place you are living? No  Reported health: Good  Dental visits? No  Hearing problems? No  Vision problems? Yes - wears contacts  Healthy diet? Yes  Exercise? No exercise  Adequate fluid intake? Yes    Medical History[1]   Surgical History[2]   Social History[3]  Family History[4]     There is no immunization history on file for this patient.    Review of Systems   Constitutional:  Positive for diaphoresis.   HENT: Negative.     Eyes: Negative.    Respiratory: Negative.     Cardiovascular: Negative.    Gastrointestinal: Negative.    Endocrine: Negative.    Genitourinary: Negative.    Musculoskeletal: Negative.    Skin: Negative.    Neurological: Negative.    Psychiatric/Behavioral: Negative.     All other systems reviewed and are negative.    Allergies[5]  Visit Vitals  /83 (BP Location: Left arm, Patient Position: Sitting)   Pulse 95   Resp 18   Ht 1.676 m (5' 6\")   Wt 125 kg (275 lb)   SpO2 98%   BMI 44.39 kg/m²   OB Status Having periods   Smoking " Status Every Day   BSA 2.41 m²     Physical Exam  Vitals reviewed. Exam conducted with a chaperone present.   Constitutional:       Appearance: Normal appearance. She is well-developed and overweight.   HENT:      Head: Normocephalic.      Right Ear: External ear normal.      Left Ear: External ear normal.      Nose: Nose normal.      Mouth/Throat:      Lips: Pink.      Mouth: Mucous membranes are moist.   Eyes:      General: Lids are normal.      Pupils: Pupils are equal, round, and reactive to light.   Neck:      Trachea: Trachea normal.   Cardiovascular:      Rate and Rhythm: Normal rate and regular rhythm.      Heart sounds: Normal heart sounds.   Pulmonary:      Effort: Pulmonary effort is normal.      Breath sounds: Normal breath sounds.   Abdominal:      General: Bowel sounds are normal.      Palpations: Abdomen is soft.   Skin:     General: Skin is warm and moist.      Findings: Lesion present.   Neurological:      General: No focal deficit present.      Mental Status: She is alert and oriented to person, place, and time. Mental status is at baseline.   Psychiatric:         Attention and Perception: Attention normal.         Mood and Affect: Mood normal.         Speech: Speech normal.         Behavior: Behavior is cooperative.         Thought Content: Thought content normal.         Cognition and Memory: Cognition normal.         Judgment: Judgment normal.       Current Outpatient Medications   Medication Instructions    amLODIPine (NORVASC) 10 mg, oral, Daily    losartan (COZAAR) 50 mg, oral, Daily    naproxen (NAPROSYN) 500 mg, oral, 2 times daily    omeprazole (PRILOSEC) 20 mg, Daily before breakfast    SEMAGLUTIDE, WEIGHT LOSS, SUBQ Inject under the skin. Compound pharmacy    traZODone (DESYREL) 100 mg, oral, Nightly PRN    Wegovy 1.75 mg, subcutaneous, Weekly     Lab Results   Component Value Date    WBC 9.2 01/18/2024    HGB 14.8 01/18/2024    HCT 45.7 01/18/2024     01/18/2024    CHOL 251 (H)  01/18/2024    TRIG 99 01/18/2024    HDL 55.9 01/18/2024    ALT 55 (H) 01/18/2024    AST 35 01/18/2024     01/18/2024    K 4.0 01/18/2024     01/18/2024    CREATININE 0.75 01/18/2024    BUN 11 01/18/2024    CO2 26 01/18/2024    TSH 1.40 01/18/2024    INR 1.0 04/15/2021    HGBA1C 4.8 01/18/2024       Problem List Items Addressed This Visit           ICD-10-CM    Benign essential hypertension I10    - c/w losartan, amlodipine  Visit Vitals  /83 (BP Location: Left arm, Patient Position: Sitting)   Pulse 95   Resp 18   - All strategies to keep the blood pressure down is explained to the patient  - Regular exercise and blood pressure monitoring is encouraged         Insomnia G47.00    - c/w PRN trazodone for sleep         Encounter for annual physical exam Z00.00    -  Preventive screening / Vaccination(s) reviewed and discussed  -  Recommended regular aerobic exercise and proper diet.  -  Discussed need and benefit for weight management  -  Medications were reviewed, list was updated, and refills given if needed.         Relevant Orders    Hemoglobin A1c    CBC and Auto Differential    Comprehensive Metabolic Panel    Lipid Panel    TSH with reflex to Free T4 if abnormal    BMI 50.0-59.9, adult (Multi) Z68.43    Relevant Medications    semaglutide, weight loss, (Wegovy) 0.25 mg/0.5 mL pen injector    Hyperhidrosis R61    - info given regarding FU to [facility] regarding this a few skin lesions that are persistent          Other Visit Diagnoses         Codes      Encounter for vitamin deficiency screening     Z13.21    Relevant Orders    TSH with reflex to Free T4 if abnormal      Lipid screening     Z13.220    Relevant Orders    Lipid Panel          --------------------  Written by Sheri Kunz RN, acting as a scribe for Dr. Arreola. This note accurately reflects the work and decisions made by Dr. Arreola.     I, Dr. Arreola, attest all medical record entries made by the scribe were under my direction and  were personally dictated by me. I have reviewed the chart and agree that the record accurately reflects my performance of the history, physical exam, and assessment and plan.          [1]   Past Medical History:  Diagnosis Date    Acute bronchitis due to other specified organisms 03/30/2020    Acute viral bronchitis    Acute sinusitis 01/18/2024    Acute URI 01/18/2024    Anxiety     Arthritis     Candidiasis, unspecified 06/13/2016    Yeast infection    Congestion of nasal sinus 01/18/2024    Eczema     Encounter for pregnancy test, result negative     Pregnancy examination or test, negative result    Encounter for pregnancy test, result unknown     Pregnancy examination or test, pregnancy unconfirmed    Essential (primary) hypertension 11/27/2018    White coat syndrome with hypertension    Fatigue 02/19/2024    GERD (gastroesophageal reflux disease) 2015    Hirsutism 06/07/2016    Hirsutism    History of varicella 02/19/2024    HTN (hypertension)     Injury of wrist 02/19/2024    Insomnia 2006    Insomnia     Low grade squamous intraepithelial lesion on cytologic smear of vagina (LGSIL) 06/02/2016    LGSIL Pap smear of vagina    Near syncope 01/18/2024    Pain in wrist 02/19/2024    Palpitations 01/18/2024    Papillomavirus as the cause of diseases classified elsewhere 06/02/2016    HPV in female    Personal history of other diseases of the female genital tract 05/02/2016    History of ovarian cyst    Personal history of other endocrine, nutritional and metabolic disease     History of hypercholesterolemia    Personal history of other endocrine, nutritional and metabolic disease 05/06/2015    History of obesity    Personal history of other infectious and parasitic diseases     History of chicken pox    Personal history of other specified conditions 02/25/2020    History of fatigue    Personal history of other specified conditions     History of abnormal Pap smear    Skin tag     Snoring 2020    Tubal ectopic  pregnancy ([facility]) 2024    Unspecified tubal pregnancy without intrauterine pregnancy ([facility])     Ectopic pregnancy, tubal   [2]   Past Surgical History:  Procedure Laterality Date     SECTION, CLASSIC  2016     Section    COLONOSCOPY  2016    Colonoscopy (Fiberoptic)    DILATION AND CURETTAGE OF UTERUS  2016    Dilation And Curettage    LAPAROSCOPY DIAGNOSTIC / BIOPSY / ASPIRATION / LYSIS  2016    Exploratory Laparoscopy    OTHER SURGICAL HISTORY  2016    Colposcopy Cervix With Biopsy(S)    OTHER SURGICAL HISTORY  2016    Laparoscopy With Excision Of Ectopic Pregnancy    TONSILLECTOMY Bilateral     circa 2018    TUBAL LIGATION  2016    Tubal Ligation   [3]   Social History  Tobacco Use    Smoking status: Every Day     Current packs/day: 1.00     Average packs/day: 1 pack/day for 21.3 years (21.3 ttl pk-yrs)     Types: Cigarettes     Start date: 2004    Smokeless tobacco: Current    Tobacco comments:     Working on quitting - down to 1/2 pack or 5-6 per day   Substance Use Topics    Alcohol use: Yes     Comment: Social    Drug use: Never   [4]   Family History  Problem Relation Name Age of Onset    Depression Mother Omi davis         with anxiety    COPD Mother Omi davis     Anxiety disorder Mother Omi davis     Hypertension Mother Omi davis     Diabetes type II Father      Obesity Father      Other (htn) Father      Other (morbid obesity) Sister      Other (morbid obesity) Brother     [5]   Allergies  Allergen Reactions    Lisinopril Cough

## 2025-05-02 ENCOUNTER — APPOINTMENT (OUTPATIENT)
Dept: PRIMARY CARE | Facility: CLINIC | Age: 35
End: 2025-05-02
Payer: COMMERCIAL

## 2025-05-02 VITALS
SYSTOLIC BLOOD PRESSURE: 124 MMHG | BODY MASS INDEX: 44.2 KG/M2 | WEIGHT: 275 LBS | OXYGEN SATURATION: 98 % | HEART RATE: 95 BPM | HEIGHT: 66 IN | RESPIRATION RATE: 18 BRPM | DIASTOLIC BLOOD PRESSURE: 83 MMHG

## 2025-05-02 DIAGNOSIS — I10 BENIGN ESSENTIAL HYPERTENSION: ICD-10-CM

## 2025-05-02 DIAGNOSIS — Z13.220 LIPID SCREENING: ICD-10-CM

## 2025-05-02 DIAGNOSIS — Z13.21 ENCOUNTER FOR VITAMIN DEFICIENCY SCREENING: ICD-10-CM

## 2025-05-02 DIAGNOSIS — Z00.00 ENCOUNTER FOR ANNUAL PHYSICAL EXAM: ICD-10-CM

## 2025-05-02 DIAGNOSIS — R61 HYPERHIDROSIS: ICD-10-CM

## 2025-05-02 DIAGNOSIS — G47.00 INSOMNIA, UNSPECIFIED TYPE: ICD-10-CM

## 2025-05-02 PROBLEM — N89.8 VAGINAL DISCHARGE: Status: RESOLVED | Noted: 2023-03-01 | Resolved: 2025-05-02

## 2025-05-02 PROBLEM — M79.671 PAIN OF RIGHT HEEL: Status: RESOLVED | Noted: 2023-03-01 | Resolved: 2025-05-02

## 2025-05-02 PROBLEM — N93.9 ABNORMAL UTERINE BLEEDING (AUB): Status: RESOLVED | Noted: 2023-03-01 | Resolved: 2025-05-02

## 2025-05-02 PROBLEM — R07.9 CHEST PAIN: Status: RESOLVED | Noted: 2023-03-01 | Resolved: 2025-05-02

## 2025-05-02 PROBLEM — N76.0 BACTERIAL VAGINOSIS: Status: RESOLVED | Noted: 2023-03-01 | Resolved: 2025-05-02

## 2025-05-02 PROBLEM — B96.89 BACTERIAL VAGINOSIS: Status: RESOLVED | Noted: 2023-03-01 | Resolved: 2025-05-02

## 2025-05-02 PROCEDURE — 3079F DIAST BP 80-89 MM HG: CPT | Performed by: INTERNAL MEDICINE

## 2025-05-02 PROCEDURE — 99395 PREV VISIT EST AGE 18-39: CPT | Performed by: INTERNAL MEDICINE

## 2025-05-02 PROCEDURE — 3074F SYST BP LT 130 MM HG: CPT | Performed by: INTERNAL MEDICINE

## 2025-05-02 PROCEDURE — 3008F BODY MASS INDEX DOCD: CPT | Performed by: INTERNAL MEDICINE

## 2025-05-02 RX ORDER — SEMAGLUTIDE 0.25 MG/.5ML
1.7 INJECTION, SOLUTION SUBCUTANEOUS WEEKLY
Qty: 14 ML | Refills: 0 | Status: SHIPPED | OUTPATIENT
Start: 2025-05-02 | End: 2025-05-05 | Stop reason: SDUPTHER

## 2025-05-02 RX ORDER — AMLODIPINE BESYLATE 10 MG/1
10 TABLET ORAL DAILY
Qty: 90 TABLET | Refills: 1 | Status: CANCELLED | OUTPATIENT
Start: 2025-05-02

## 2025-05-02 RX ORDER — LOSARTAN POTASSIUM 50 MG/1
50 TABLET ORAL DAILY
Qty: 90 TABLET | Refills: 1 | Status: CANCELLED | OUTPATIENT
Start: 2025-05-02

## 2025-05-02 ASSESSMENT — PROMIS GLOBAL HEALTH SCALE
RATE_SOCIAL_SATISFACTION: VERY GOOD
CARRYOUT_PHYSICAL_ACTIVITIES: MOSTLY
RATE_GENERAL_HEALTH: GOOD
RATE_MENTAL_HEALTH: GOOD
RATE_AVERAGE_FATIGUE: MILD
EMOTIONAL_PROBLEMS: RARELY
RATE_PHYSICAL_HEALTH: FAIR
RATE_AVERAGE_PAIN: 0
CARRYOUT_SOCIAL_ACTIVITIES: GOOD
RATE_QUALITY_OF_LIFE: VERY GOOD

## 2025-05-02 ASSESSMENT — ENCOUNTER SYMPTOMS
NEUROLOGICAL NEGATIVE: 1
MUSCULOSKELETAL NEGATIVE: 1
PSYCHIATRIC NEGATIVE: 1
DIAPHORESIS: 1
CARDIOVASCULAR NEGATIVE: 1
RESPIRATORY NEGATIVE: 1
ENDOCRINE NEGATIVE: 1
GASTROINTESTINAL NEGATIVE: 1
EYES NEGATIVE: 1

## 2025-05-02 ASSESSMENT — PAIN SCALES - GENERAL: PAINLEVEL_OUTOF10: 0-NO PAIN

## 2025-05-02 NOTE — ASSESSMENT & PLAN NOTE
-  Preventive screening / Vaccination(s) reviewed and discussed  -  Recommended regular aerobic exercise and proper diet.  -  Discussed need and benefit for weight management  -  Medications were reviewed, list was updated, and refills given if needed.

## 2025-05-02 NOTE — ASSESSMENT & PLAN NOTE
- c/w losartan, amlodipine  Visit Vitals  /83 (BP Location: Left arm, Patient Position: Sitting)   Pulse 95   Resp 18   - All strategies to keep the blood pressure down is explained to the patient  - Regular exercise and blood pressure monitoring is encouraged

## 2025-05-02 NOTE — PROGRESS NOTES
Subjective   Patient ID:   Lala Smart is a 35 y.o. female who presents for Annual Exam.  HPI  Pain scale: 0 (no pain)  Living will? No  POA? No  Are you currently or have you recently been threatened or abused? No  Do you feel unsafe going back to the place you are living? No  Reported health: Good  Dental visits? No  Hearing problems? No  Vision problems? Yes - wears contacts  Healthy diet? Yes  Exercise? No exercise  Adequate fluid intake? Yes    Social History[1]      There is no immunization history on file for this patient.    Review of Systems  12 point review of systems negative unless stated above in HPI    There were no vitals filed for this visit.    Physical Exam  General: Alert and oriented, well nourished, no acute distress.  Lungs: Clear to auscultation, non-labored respiration.  Heart: Normal rate, regular rhythm, no murmur, gallop or edema.  Neurologic: Awake, alert, and oriented X3, CN II-XII intact.  Psychiatric: Cooperative, appropriate mood and affect.    Assessment/Plan          [1]   Social History  Tobacco Use    Smoking status: Every Day     Current packs/day: 1.00     Average packs/day: 1 pack/day for 21.3 years (21.3 ttl pk-yrs)     Types: Cigarettes     Start date: 1/1/2004    Smokeless tobacco: Current    Tobacco comments:     Working on quitting - down to 1/2 pack or 5-6 per day   Substance Use Topics    Alcohol use: Yes     Comment: Social    Drug use: Never

## 2025-05-02 NOTE — PATIENT INSTRUCTIONS
It was nice to see you in the office today!  As discussed during our visit...     Continue the same medications.  Your chronic conditions appear stable.  Call the office with any questions or concerns [phone]    Follow up in 6 months or sooner if needed.   ---------    Make an appt with ...  [facility]  [address] [place] [id number]  Phone: [phone]  ... To have the areas of concern addressed.  [facility] Website  [email or web address]   --------------------  Please have your 12 hour FASTING blood drawn in the next couple weeks.    You do NOT need an appointment for lab work and/or Xray's but now that  was bought out by [employer], an appointment for lab work is RECOMMENDED / encouraged.    Do not get lab work done while you are on steroids (prednisone, medrol dose pack, dexamethasone) unless instructed differently by Dr Arreola because this can cause some labs to be off.  Hold Biotin, B vitamins, B Complex for 2-3 days before any blood draw (this can cause false thyroid function tests)  During the 12 hour FASTING time, you may have BLACK coffee, BLACK tea and/or water at any time.    The two nearest Outpatient Lab's to THIS office is:  [facility] (this is IN the Urgent Care building by [employer])  [address]  [place] [address] [phone]    Link to Schedule an Appointment:  [email or web address]    Hours:   Monday through Friday 7:30am - 4:30pm  CLOSED Saturday and Sunday    Or you can go to ...  [place] [id number] [facility] [address]  [place], [place] [id number] [phone]    Link to Schedule an Appointment:  [email or web address]    Hours:   Monday  through Friday 7am - 12:30pm, 1pm - 4:30pm  CLOSED Saturday and Sunday     Click the blue link to take you to the website to confirm hours/location Location Search for Labwork  [email or web address]    We will contact you with the results of your blood work (once they have ALL finalized & Dr Arreola has reviewed them) and let you know of any necessary adjustments need to be done to your plan of care.    If you do not hear from us within 3-5 days business days of having your blood drawn, please call the [place] office at [phone].   -----------  The  [employer] offers affordable laboratory tests.   Financial assistance is also available to those who meet financial eligibility requirements by submitting an [facility] Application or calling, [phone].   Click this link to Get a Price Estimate Today on what lab work may cost you.

## 2025-05-05 RX ORDER — SEMAGLUTIDE 0.25 MG/.5ML
1.7 INJECTION, SOLUTION SUBCUTANEOUS WEEKLY
Qty: 14 ML | Refills: 0 | Status: SHIPPED | OUTPATIENT
Start: 2025-05-05 | End: 2025-05-06

## 2025-05-06 RX ORDER — SEMAGLUTIDE 0.25 MG/.5ML
1.7 INJECTION, SOLUTION SUBCUTANEOUS WEEKLY
Qty: 2 ML | Refills: 0 | Status: SHIPPED | OUTPATIENT
Start: 2025-05-06

## 2025-05-16 ENCOUNTER — TELEPHONE (OUTPATIENT)
Dept: PRIMARY CARE | Facility: CLINIC | Age: 35
End: 2025-05-16
Payer: COMMERCIAL

## 2025-05-21 ENCOUNTER — APPOINTMENT (OUTPATIENT)
Dept: DERMATOLOGY | Facility: CLINIC | Age: 35
End: 2025-05-21
Payer: COMMERCIAL

## 2025-05-30 DIAGNOSIS — G47.00 INSOMNIA, UNSPECIFIED TYPE: ICD-10-CM

## 2025-05-30 RX ORDER — TRAZODONE HYDROCHLORIDE 100 MG/1
100 TABLET ORAL NIGHTLY PRN
Qty: 30 TABLET | Refills: 0 | Status: SHIPPED | OUTPATIENT
Start: 2025-05-30

## 2025-06-12 LAB
ALBUMIN SERPL-MCNC: 4.2 G/DL (ref 3.6–5.1)
ALP SERPL-CCNC: 61 U/L (ref 31–125)
ALT SERPL-CCNC: 29 U/L (ref 6–29)
ANION GAP SERPL CALCULATED.4IONS-SCNC: 11 MMOL/L (CALC) (ref 7–17)
AST SERPL-CCNC: 22 U/L (ref 10–30)
BASOPHILS # BLD AUTO: 70 CELLS/UL (ref 0–200)
BASOPHILS NFR BLD AUTO: 0.8 %
BILIRUB SERPL-MCNC: 0.4 MG/DL (ref 0.2–1.2)
BUN SERPL-MCNC: 8 MG/DL (ref 7–25)
CALCIUM SERPL-MCNC: 9.1 MG/DL (ref 8.6–10.2)
CHLORIDE SERPL-SCNC: 106 MMOL/L (ref 98–110)
CHOLEST SERPL-MCNC: 225 MG/DL
CHOLEST/HDLC SERPL: 3.6 (CALC)
CO2 SERPL-SCNC: 24 MMOL/L (ref 20–32)
CREAT SERPL-MCNC: 0.79 MG/DL (ref 0.5–0.97)
EGFRCR SERPLBLD CKD-EPI 2021: 99 ML/MIN/1.73M2
EOSINOPHIL # BLD AUTO: 139 CELLS/UL (ref 15–500)
EOSINOPHIL NFR BLD AUTO: 1.6 %
ERYTHROCYTE [DISTWIDTH] IN BLOOD BY AUTOMATED COUNT: 13.1 % (ref 11–15)
EST. AVERAGE GLUCOSE BLD GHB EST-MCNC: 97 MG/DL
EST. AVERAGE GLUCOSE BLD GHB EST-SCNC: 5.4 MMOL/L
GLUCOSE SERPL-MCNC: 83 MG/DL (ref 65–99)
HBA1C MFR BLD: 5 %
HCT VFR BLD AUTO: 46.8 % (ref 35–45)
HDLC SERPL-MCNC: 63 MG/DL
HGB BLD-MCNC: 15.2 G/DL (ref 11.7–15.5)
LDLC SERPL CALC-MCNC: 135 MG/DL (CALC)
LYMPHOCYTES # BLD AUTO: 1766 CELLS/UL (ref 850–3900)
LYMPHOCYTES NFR BLD AUTO: 20.3 %
MCH RBC QN AUTO: 32.4 PG (ref 27–33)
MCHC RBC AUTO-ENTMCNC: 32.5 G/DL (ref 32–36)
MCV RBC AUTO: 99.8 FL (ref 80–100)
MONOCYTES # BLD AUTO: 557 CELLS/UL (ref 200–950)
MONOCYTES NFR BLD AUTO: 6.4 %
NEUTROPHILS # BLD AUTO: 6168 CELLS/UL (ref 1500–7800)
NEUTROPHILS NFR BLD AUTO: 70.9 %
NONHDLC SERPL-MCNC: 162 MG/DL (CALC)
PLATELET # BLD AUTO: 270 THOUSAND/UL (ref 140–400)
PMV BLD REES-ECKER: 10.8 FL (ref 7.5–12.5)
POTASSIUM SERPL-SCNC: 3.9 MMOL/L (ref 3.5–5.3)
PROT SERPL-MCNC: 6.8 G/DL (ref 6.1–8.1)
RBC # BLD AUTO: 4.69 MILLION/UL (ref 3.8–5.1)
SODIUM SERPL-SCNC: 141 MMOL/L (ref 135–146)
TRIGL SERPL-MCNC: 142 MG/DL
TSH SERPL-ACNC: 1.47 MIU/L
WBC # BLD AUTO: 8.7 THOUSAND/UL (ref 3.8–10.8)

## 2025-07-09 DIAGNOSIS — G47.00 INSOMNIA, UNSPECIFIED TYPE: ICD-10-CM

## 2025-07-09 RX ORDER — TRAZODONE HYDROCHLORIDE 100 MG/1
100 TABLET ORAL NIGHTLY PRN
Qty: 30 TABLET | Refills: 0 | Status: SHIPPED | OUTPATIENT
Start: 2025-07-09

## 2025-07-18 DIAGNOSIS — I10 ESSENTIAL (PRIMARY) HYPERTENSION: ICD-10-CM

## 2025-07-18 RX ORDER — AMLODIPINE BESYLATE 10 MG/1
10 TABLET ORAL DAILY
Qty: 90 TABLET | Refills: 0 | Status: SHIPPED | OUTPATIENT
Start: 2025-07-18

## 2025-07-30 DIAGNOSIS — I10 UNCONTROLLED HYPERTENSION: ICD-10-CM

## 2025-07-30 RX ORDER — LOSARTAN POTASSIUM 50 MG/1
50 TABLET ORAL DAILY
Qty: 90 TABLET | Refills: 0 | Status: SHIPPED | OUTPATIENT
Start: 2025-07-30

## 2025-08-12 DIAGNOSIS — G47.00 INSOMNIA, UNSPECIFIED TYPE: ICD-10-CM

## 2025-08-12 RX ORDER — TRAZODONE HYDROCHLORIDE 100 MG/1
100 TABLET ORAL NIGHTLY PRN
Qty: 30 TABLET | Refills: 0 | Status: SHIPPED | OUTPATIENT
Start: 2025-08-12